# Patient Record
Sex: FEMALE | Race: BLACK OR AFRICAN AMERICAN | NOT HISPANIC OR LATINO | Employment: FULL TIME | URBAN - METROPOLITAN AREA
[De-identification: names, ages, dates, MRNs, and addresses within clinical notes are randomized per-mention and may not be internally consistent; named-entity substitution may affect disease eponyms.]

---

## 2017-11-21 ENCOUNTER — OFFICE VISIT (OUTPATIENT)
Dept: PRIMARY CARE CLINIC | Facility: CLINIC | Age: 32
End: 2017-11-21
Payer: COMMERCIAL

## 2017-11-21 VITALS
HEART RATE: 78 BPM | DIASTOLIC BLOOD PRESSURE: 76 MMHG | SYSTOLIC BLOOD PRESSURE: 130 MMHG | TEMPERATURE: 98 F | HEIGHT: 63 IN | WEIGHT: 238.13 LBS | BODY MASS INDEX: 42.19 KG/M2

## 2017-11-21 DIAGNOSIS — R50.9 FEVER, UNSPECIFIED FEVER CAUSE: ICD-10-CM

## 2017-11-21 DIAGNOSIS — J02.9 SORE THROAT: Primary | ICD-10-CM

## 2017-11-21 DIAGNOSIS — R53.83 FATIGUE, UNSPECIFIED TYPE: ICD-10-CM

## 2017-11-21 PROCEDURE — 99999 PR PBB SHADOW E&M-EST. PATIENT-LVL III: CPT | Mod: PBBFAC,,, | Performed by: NURSE PRACTITIONER

## 2017-11-21 PROCEDURE — 99202 OFFICE O/P NEW SF 15 MIN: CPT | Mod: S$GLB,,, | Performed by: NURSE PRACTITIONER

## 2017-11-21 RX ORDER — AMOXICILLIN 875 MG/1
875 TABLET, FILM COATED ORAL EVERY 12 HOURS
Qty: 20 TABLET | Refills: 0 | Status: SHIPPED | OUTPATIENT
Start: 2017-11-21 | End: 2017-12-01

## 2017-11-21 NOTE — PATIENT INSTRUCTIONS

## 2017-11-21 NOTE — LETTER
November 21, 2017      [facility]  [address], [address]  [facility] [address]  Phone: [phone]  Fax: [phone]       Patient: Sotero Pelaez   YOB: 1985  Date of Visit: 11/21/2017    To Whom It May Concern:    Willie Pelaez  was at [facility] on 11/21/2017. She may return to work/school on 11/24/17 with no restrictions if no fever. If you have any questions or concerns, or if I can be of further assistance, please do not hesitate to contact me.    Sincerely,    Lacey Agosto, BHAVANA, NP-C

## 2017-11-21 NOTE — PROGRESS NOTES
Subjective:       Patient ID: Sotero Pelaez is a 32 y.o. female.    Chief Complaint: Sore Throat and Rhinitis    Sore Throat    The current episode started in the past 7 days. The problem has been gradually worsening. Neither side of throat is experiencing more pain than the other. The maximum temperature recorded prior to her arrival was 100.4 - 100.9 F. The fever has been present for 1 to 2 days. The pain is at a severity of 8/10. The pain is moderate. Associated symptoms include congestion and swollen glands. Pertinent negatives include no abdominal pain, coughing, diarrhea, drooling, ear discharge, ear pain, headaches, hoarse voice, plugged ear sensation, neck pain, shortness of breath, stridor, trouble swallowing or vomiting. She has had exposure to strep. She has had no exposure to mono. She has tried acetaminophen and cool liquids for the symptoms. The treatment provided no relief.     Review of Systems   Constitutional: Positive for fatigue and fever.   HENT: Positive for congestion and sore throat. Negative for drooling, ear discharge, ear pain, hoarse voice, postnasal drip, rhinorrhea, sinus pain, sinus pressure, sneezing and trouble swallowing.    Respiratory: Negative for cough, shortness of breath, wheezing and stridor.    Gastrointestinal: Negative for abdominal pain, constipation, diarrhea, nausea and vomiting.   Genitourinary: Negative for difficulty urinating.   Musculoskeletal: Negative for neck pain.   Skin: Negative for rash.   Neurological: Negative for headaches.   Hematological: Positive for adenopathy.   All other systems reviewed and are negative.      Objective:      Physical Exam   Constitutional: She is oriented to person, place, and time. She appears well-developed and well-nourished. No distress.   HENT:   Head: Normocephalic and atraumatic.   Right Ear: External ear normal.   Left Ear: External ear normal.   Mouth/Throat: No oropharyngeal exudate.   Nose: nasal mucosa pink and  boggy with scant amount mucoid congestion.  Throat: tonsils 2+, erythematous.  Appears strep like.  Tongue midline airway patent.    Eyes: Conjunctivae are normal. Right eye exhibits no discharge. Left eye exhibits no discharge. No scleral icterus.   Neck: Normal range of motion. Neck supple.   Cardiovascular: Normal rate, regular rhythm, normal heart sounds and intact distal pulses.    Pulmonary/Chest: Effort normal and breath sounds normal.   Abdominal: Soft. Bowel sounds are normal. She exhibits no distension. There is no tenderness.   Musculoskeletal: Normal range of motion. She exhibits no edema.   Lymphadenopathy:     She has cervical adenopathy (bilateral anterior cervical ).   Neurological: She is alert and oriented to person, place, and time.   Skin: Skin is warm and dry. Capillary refill takes less than 2 seconds. She is not diaphoretic.   Psychiatric: She has a normal mood and affect. Her behavior is normal.   Nursing note and vitals reviewed.      Assessment:       1. Sore throat    2. Fever, unspecified fever cause    3. Fatigue, unspecified type        Plan:       Sore throat  Fever  Fatigue   Rapid strep A is negative.  Influenza is negative.  Given patient exam and exposure to strep from family member will treat with Amoxicillin.     Patient was instructed to increase fluids and rest today.  May use throat lozenge of their choice and OTC fever/pain reducer as per label instructions.  Warm salt water gargles with 1/4 tsp of salt with 8 oz of water 2-3 times per day.  May use OTC antihistamine of choice as per label instructions.     Medication List with Changes/Refills   New Medications    AMOXICILLIN (AMOXIL) 875 MG TABLET    Take 1 tablet (875 mg total) by mouth every 12 (twelve) hours.   Current Medications    CLOBETASOL 0.05% (TEMOVATE) 0.05 % OINT    Apply topically 2 (two) times daily.           I have reviewed the patient's past medical/surgical and social histories and updated as appropriate.  Medications were reviewed and discussed as appropriate including side effects and risks versus benefit.     Plan of care was reviewed and agreed upon with the patient.  An opportunity to ask questions was provided and explanation given. Patient verbalized understanding on all information reviewed and discussed.  The patient will follow up with PCP or sooner if needed. If symptoms worsen patient may call for ASAP appointment or report to the emergency department for further evaluation.

## 2017-12-22 ENCOUNTER — TELEPHONE (OUTPATIENT)
Dept: OBSTETRICS AND GYNECOLOGY | Facility: CLINIC | Age: 32
End: 2017-12-22

## 2017-12-22 ENCOUNTER — PATIENT MESSAGE (OUTPATIENT)
Dept: OBSTETRICS AND GYNECOLOGY | Facility: CLINIC | Age: 32
End: 2017-12-22

## 2017-12-27 ENCOUNTER — OFFICE VISIT (OUTPATIENT)
Dept: OBSTETRICS AND GYNECOLOGY | Facility: CLINIC | Age: 32
End: 2017-12-27
Payer: COMMERCIAL

## 2017-12-27 ENCOUNTER — LAB VISIT (OUTPATIENT)
Dept: LAB | Facility: HOSPITAL | Age: 32
End: 2017-12-27
Attending: NURSE PRACTITIONER
Payer: COMMERCIAL

## 2017-12-27 VITALS
DIASTOLIC BLOOD PRESSURE: 68 MMHG | HEART RATE: 78 BPM | SYSTOLIC BLOOD PRESSURE: 105 MMHG | BODY MASS INDEX: 42.23 KG/M2 | WEIGHT: 238.31 LBS | HEIGHT: 63 IN

## 2017-12-27 DIAGNOSIS — Z11.3 ENCOUNTER FOR SCREENING EXAMINATION FOR SEXUALLY TRANSMITTED DISEASE: ICD-10-CM

## 2017-12-27 DIAGNOSIS — Z30.8 ENCOUNTER FOR OTHER CONTRACEPTIVE MANAGEMENT: ICD-10-CM

## 2017-12-27 DIAGNOSIS — Z01.419 ENCOUNTER FOR WELL WOMAN EXAM: Primary | ICD-10-CM

## 2017-12-27 LAB
C TRACH DNA SPEC QL NAA+PROBE: NOT DETECTED
CANDIDA RRNA VAG QL PROBE: NEGATIVE
G VAGINALIS RRNA GENITAL QL PROBE: POSITIVE
N GONORRHOEA DNA SPEC QL NAA+PROBE: NOT DETECTED
T VAGINALIS RRNA GENITAL QL PROBE: NEGATIVE

## 2017-12-27 PROCEDURE — 86592 SYPHILIS TEST NON-TREP QUAL: CPT

## 2017-12-27 PROCEDURE — 88175 CYTOPATH C/V AUTO FLUID REDO: CPT

## 2017-12-27 PROCEDURE — 99395 PREV VISIT EST AGE 18-39: CPT | Mod: S$GLB,,, | Performed by: NURSE PRACTITIONER

## 2017-12-27 PROCEDURE — 87591 N.GONORRHOEAE DNA AMP PROB: CPT

## 2017-12-27 PROCEDURE — 36415 COLL VENOUS BLD VENIPUNCTURE: CPT

## 2017-12-27 PROCEDURE — 99999 PR PBB SHADOW E&M-EST. PATIENT-LVL III: CPT | Mod: PBBFAC,,, | Performed by: NURSE PRACTITIONER

## 2017-12-27 PROCEDURE — 87660 TRICHOMONAS VAGIN DIR PROBE: CPT

## 2017-12-27 PROCEDURE — 86703 HIV-1/HIV-2 1 RESULT ANTBDY: CPT

## 2017-12-27 PROCEDURE — 87480 CANDIDA DNA DIR PROBE: CPT

## 2017-12-28 ENCOUNTER — TELEPHONE (OUTPATIENT)
Dept: OBSTETRICS AND GYNECOLOGY | Facility: CLINIC | Age: 32
End: 2017-12-28

## 2017-12-28 LAB
HIV 1+2 AB+HIV1 P24 AG SERPL QL IA: NEGATIVE
RPR SER QL: NORMAL

## 2017-12-28 RX ORDER — TINIDAZOLE 500 MG/1
2 TABLET ORAL
Qty: 8 TABLET | Refills: 0 | Status: SHIPPED | OUTPATIENT
Start: 2017-12-28 | End: 2017-12-30

## 2017-12-28 NOTE — TELEPHONE ENCOUNTER
Patient informed of results and recommendations as noted by AIMEE Barrera NP. Patient verbalized understanding.

## 2017-12-28 NOTE — TELEPHONE ENCOUNTER
Please inform pt that her vaginosis screening was negative for yeast and trichomonas and positive for bacterial vaginosis (BV).     I am sending in a prescription for tindamax to treat the BV. Take 4 pills all at once on the first day with a meal and do the same on the second day.     Her cervical cultures were negative for chlamydia and gonorrhea and her HIV and syphilis results were also negative.     Thanks!

## 2018-01-02 ENCOUNTER — TELEPHONE (OUTPATIENT)
Dept: OBSTETRICS AND GYNECOLOGY | Facility: CLINIC | Age: 33
End: 2018-01-02

## 2018-01-02 NOTE — TELEPHONE ENCOUNTER
----- Message from Mayra Pond sent at 1/2/2018 11:21 AM CST -----  Contact: self   Patient want to speak with a nurse regarding having her birth control before she come to appointment please call back at [phone]

## 2018-01-03 DIAGNOSIS — Z30.017 ENCOUNTER FOR INITIAL PRESCRIPTION OF IMPLANTABLE SUBDERMAL CONTRACEPTIVE: Primary | ICD-10-CM

## 2018-01-05 ENCOUNTER — OFFICE VISIT (OUTPATIENT)
Dept: OBSTETRICS AND GYNECOLOGY | Facility: CLINIC | Age: 33
End: 2018-01-05
Payer: COMMERCIAL

## 2018-01-05 VITALS
WEIGHT: 241.63 LBS | HEART RATE: 93 BPM | DIASTOLIC BLOOD PRESSURE: 77 MMHG | HEIGHT: 63 IN | SYSTOLIC BLOOD PRESSURE: 130 MMHG | BODY MASS INDEX: 42.81 KG/M2

## 2018-01-05 DIAGNOSIS — Z01.812 PRE-PROCEDURE LAB EXAM: ICD-10-CM

## 2018-01-05 DIAGNOSIS — N89.8 VAGINAL ODOR: ICD-10-CM

## 2018-01-05 DIAGNOSIS — N89.8 VAGINAL DISCHARGE: ICD-10-CM

## 2018-01-05 DIAGNOSIS — Z11.3 ENCOUNTER FOR SCREENING EXAMINATION FOR SEXUALLY TRANSMITTED DISEASE: ICD-10-CM

## 2018-01-05 DIAGNOSIS — Z30.017 NEXPLANON INSERTION: Primary | ICD-10-CM

## 2018-01-05 LAB
B-HCG UR QL: NEGATIVE
CANDIDA RRNA VAG QL PROBE: POSITIVE
CTP QC/QA: YES
G VAGINALIS RRNA GENITAL QL PROBE: NEGATIVE
T VAGINALIS RRNA GENITAL QL PROBE: NEGATIVE

## 2018-01-05 PROCEDURE — 99999 PR PBB SHADOW E&M-EST. PATIENT-LVL III: CPT | Mod: PBBFAC,,, | Performed by: NURSE PRACTITIONER

## 2018-01-05 PROCEDURE — 87491 CHLMYD TRACH DNA AMP PROBE: CPT

## 2018-01-05 PROCEDURE — 11981 INSERTION DRUG DLVR IMPLANT: CPT | Mod: S$GLB,,, | Performed by: NURSE PRACTITIONER

## 2018-01-05 PROCEDURE — 87480 CANDIDA DNA DIR PROBE: CPT

## 2018-01-05 PROCEDURE — 99213 OFFICE O/P EST LOW 20 MIN: CPT | Mod: 25,S$GLB,, | Performed by: NURSE PRACTITIONER

## 2018-01-05 PROCEDURE — 81025 URINE PREGNANCY TEST: CPT | Mod: S$GLB,,, | Performed by: NURSE PRACTITIONER

## 2018-01-05 RX ORDER — LIDOCAINE HYDROCHLORIDE 10 MG/ML
2 INJECTION, SOLUTION EPIDURAL; INFILTRATION; INTRACAUDAL; PERINEURAL
Status: COMPLETED | OUTPATIENT
Start: 2018-01-05 | End: 2018-01-05

## 2018-01-05 RX ADMIN — LIDOCAINE HYDROCHLORIDE 20 MG: 10 INJECTION, SOLUTION EPIDURAL; INFILTRATION; INTRACAUDAL; PERINEURAL at 03:01

## 2018-01-05 NOTE — PROGRESS NOTES
CC: NEXPLANON INSERTION      PRE-NEXPLANON INSERTION COUNSELING:  All contraceptive options were reviewed and the patient chooses Nexplanon.  Patients history was reviewed and there were no contraindications to Nexplanon.  The procedure and minimal risks of pain, bleeding, bruising and infection at the insertion site discussed. Possible irregular menstrual bleeding pattern versus amenorrhea was explained.  No protection against STDs discussed.  Written information provided; all questions answered and patient agrees to proceed.  Consent signed and scanned into computer.  NEXPLANON LOT #[id number],  EXPIRATION 05/2020    Vitals:    01/05/18 1431   BP: 130/77   Pulse: 93       EXAM:  With patient in supine position the nondominant arm was flexed at the elbow and externally rotated.  The insertion site was identified 6-8 cm above the elbow crease at the inner side of the upper arm overlying the groove between biceps and triceps.  The insertion site was marked and a second verito was placed 6-8 cm above the first.    PROCEDURE:  TIME OUT PERFORMED.  The insertion site was prepped with antiseptic and injected with 2 cc of 1% Xylocaine without epinephrine subq along the planned insertion canal.  Using sterile technique the Nexplanon applicator was visually verified and removed from the blister pack.  The Transparent Protection Cap was removed by sliding it horizontally in the direction of the arrow away from the needle.  The white colored implant was visualized by looking into the tip of the needle.   The needle tip was inserted bevel side up at a 30 degree angle to penetrate the skin.  The applicator was lowered parallel to the arm and the skin was tented with the needle.  While lifting skin with the needle, the needle was inserted to its full length.  Keeping the applicator in place, the purple slider was unlocked by pushing it slightly down.  The slider was then moved fully back until it stopped.  The applicator was then  removed. The Needle was noted to be fully retracted and only the purple tip of the obturator was visible.  The implant was palpable beneath the skin after insertion.  A small adhesive bandage and then a pressure bandage was placed over the insertion site.  The patient tolerated the procedure well.    ASSESSMENT:  1. Contraception management / Nexplanon insertion.V25.0.    POST NEXPLANON INSERTION COUNSELING:  Manage post nexplanon placement arm pain with NSAIDs, Tylenol or Rx per MedCard.  Keep arm elevated and apply intermittent ice packs to decrease pain and bruising for 24 Hours.  May remove bandage in 24 hours.  Nexplanon danger signs (worsening pain at insertion site, bleeding through bandage, redness and/or pus drainage at insertion site).  Removal in 3 years.    Counseling lasted approximately 15 minutes and all her questions were answered.    FOLLOW-UP: with me in 4 weeks.

## 2018-01-05 NOTE — PROGRESS NOTES
Chief Complaint   Patient presents with    Contraception    Vaginal Discharge     thick       History of Present Illness: Sotero Pelaez is a 32 y.o. female that presents today 2018 for   Pt here today c/o vaginal discharge that is thick white and has an odor. She denies any itching or burning. She reports that she had oral intercourse this past weekend and would also like STD testing. Pt is also having nexplanon removed and a new one re-inserted (see other note for insertion).       Chief Complaint   Patient presents with    Contraception    Vaginal Discharge     thick         Past Medical History:   Diagnosis Date    Abnormal Pap smear     No further treatment per patient.  Recheck normal per patient.    Asthma     since younger    Herpes simplex without mention of complication        Past Surgical History:   Procedure Laterality Date    EYE SURGERY         No current outpatient prescriptions on file.     No current facility-administered medications for this visit.        Review of patient's allergies indicates:  No Known Allergies    Family History   Problem Relation Age of Onset    Breast cancer Maternal Aunt     Colon cancer Neg Hx     Ovarian cancer Neg Hx        Social History   Substance Use Topics    Smoking status: Never Smoker    Smokeless tobacco: Never Used    Alcohol use Yes      Comment: Seldomly       OB History    Para Term  AB Living   3 2 2   1 2   SAB TAB Ectopic Multiple Live Births   0       2      # Outcome Date GA Lbr Sushil/2nd Weight Sex Delivery Anes PTL Lv   3 Term 11   3.175 kg (7 lb) M Vag-Spont EPI N CORINA   2 AB 09           1 Term 08   2.838 kg (6 lb 4.1 oz) M Vag-Spont EPI N CORINA          Review of Symptoms:  GENERAL: Denies weight gain or weight loss. Feeling well overall.   SKIN: Denies rash or lesions.   ABDOMEN: No abdominal pain, constipation, diarrhea, nausea, vomiting or rectal bleeding.   URINARY: No frequency, dysuria,  "hematuria, or burning on urination.    /77   Pulse 93   Ht 5' 3" (1.6 m)   Wt 109.6 kg (241 lb 10 oz)   LMP 12/19/2017   Physical Exam:  APPEARANCE: Well nourished, well developed, in no acute distress.  SKIN: Normal skin turgor, no lesions.  RESPIRATORY: Normal respiratory effort with no retractions or use of accessory muscles  PELVIC: Normal external female genitalia without lesions. Normal hair distribution. Vagina moist and well rugated without lesions; + thick clumpy green discharge; odor noted. Cervix pink and without lesions.   EXTREMITIES: Nexplanon removed from L upper inner arm. Area cleaned with betadine and 2cc lidocaine injected into area right above initial insertion site of device. Sterile gloves applied. Small incision made above initial insertion site with #11 blade. Nexplanon device removed without difficulty with hemostats. Device fully intact. See procedure note for insertion of new nexplanon.     ASSESSMENT/PLAN:  Nexplanon insertion    Pre-procedure lab exam  -     POCT Urine Pregnancy    Vaginal discharge  -     Vaginosis Screen by DNA Probe    Vaginal odor  -     Vaginosis Screen by DNA Probe    Encounter for screening examination for sexually transmitted disease  -     Vaginosis Screen by DNA Probe  -     C. trachomatis/N. gonorrhoeae by AMP DNA Cervix      Follow-up:  Will follow-up once results are received  RTC if symptoms worsen or do not improve  RTC as needed      "

## 2018-01-07 LAB
C TRACH DNA SPEC QL NAA+PROBE: NOT DETECTED
N GONORRHOEA DNA SPEC QL NAA+PROBE: NOT DETECTED

## 2018-01-08 ENCOUNTER — TELEPHONE (OUTPATIENT)
Dept: OBSTETRICS AND GYNECOLOGY | Facility: CLINIC | Age: 33
End: 2018-01-08

## 2018-01-08 RX ORDER — FLUCONAZOLE 150 MG/1
150 TABLET ORAL DAILY
Qty: 1 TABLET | Refills: 1 | Status: SHIPPED | OUTPATIENT
Start: 2018-01-08 | End: 2018-02-06

## 2018-02-06 ENCOUNTER — OFFICE VISIT (OUTPATIENT)
Dept: OBSTETRICS AND GYNECOLOGY | Facility: CLINIC | Age: 33
End: 2018-02-06
Payer: COMMERCIAL

## 2018-02-06 VITALS — WEIGHT: 240.5 LBS | HEIGHT: 63 IN | BODY MASS INDEX: 42.61 KG/M2

## 2018-02-06 DIAGNOSIS — Z97.5 NEXPLANON IN PLACE: Primary | ICD-10-CM

## 2018-02-06 PROCEDURE — 99999 PR PBB SHADOW E&M-EST. PATIENT-LVL II: CPT | Mod: PBBFAC,,, | Performed by: NURSE PRACTITIONER

## 2018-02-06 PROCEDURE — 99499 UNLISTED E&M SERVICE: CPT | Mod: S$GLB,,, | Performed by: NURSE PRACTITIONER

## 2018-02-06 NOTE — PROGRESS NOTES
Chief Complaint   Patient presents with    Contraception     Nexplanon Follow Up       History of Present Illness: Sotero Pelaez is a 32 y.o. female that presents today 2018 for   Patient presents for 1 month follow-up for nexplanon insertion.  Patient reports no issues with implant and has experienced cessation of cycle since implant was placed.     No other complaints or comments at this time      Chief Complaint   Patient presents with    Contraception     Nexplanon Follow Up         Past Medical History:   Diagnosis Date    Abnormal Pap smear     No further treatment per patient.  Recheck normal per patient.    Asthma     since younger    Herpes simplex without mention of complication        Past Surgical History:   Procedure Laterality Date    EYE SURGERY         No current outpatient prescriptions on file.     No current facility-administered medications for this visit.        Review of patient's allergies indicates:  No Known Allergies    Family History   Problem Relation Age of Onset    Breast cancer Maternal Aunt     Colon cancer Neg Hx     Ovarian cancer Neg Hx        Social History   Substance Use Topics    Smoking status: Never Smoker    Smokeless tobacco: Never Used    Alcohol use Yes      Comment: Seldomly       OB History    Para Term  AB Living   3 2 2   1 2   SAB TAB Ectopic Multiple Live Births   0       2      # Outcome Date GA Lbr Sushil/2nd Weight Sex Delivery Anes PTL Lv   3 Term 11   3.175 kg (7 lb) M Vag-Spont EPI N CORINA   2 AB 09           1 Term 08   2.838 kg (6 lb 4.1 oz) M Vag-Spont EPI N CORINA          Review of Symptoms:  GENERAL: Denies weight gain or weight loss. Feeling well overall.   SKIN: Denies rash or lesions.   HEAD: Denies head injury or headache.   CHEST: Denies chest pain or shortness of breath.   CARDIOVASCULAR: Denies palpitations or left sided chest pain.   ABDOMEN: No abdominal pain, constipation, diarrhea, nausea, vomiting  "or rectal bleeding.   URINARY: No frequency, dysuria, hematuria, or burning on urination.    Ht 5' 3" (1.6 m)   Wt 109.1 kg (240 lb 8.4 oz)   Physical Exam:  APPEARANCE: Well nourished, well developed, in no acute distress.  SKIN: Normal skin turgor, no lesions.  RESPIRATORY: Normal respiratory effort with no retractions or use of accessory muscles  EXTREMITIES: Device was palpated under the skin of the left medial upper arm.  Insertion site closed and resurfaced.      ASSESSMENT/PLAN:  Nexplanon in place    Follow-up:  RTC for well woman exam or as needed        "

## 2018-03-08 PROBLEM — D64.9 ANEMIA: Status: ACTIVE | Noted: 2018-03-08

## 2018-03-08 PROBLEM — R73.02 IGT (IMPAIRED GLUCOSE TOLERANCE): Status: ACTIVE | Noted: 2018-03-08

## 2018-05-08 PROBLEM — L02.429 BOIL, THIGH: Status: ACTIVE | Noted: 2018-05-08

## 2019-02-08 ENCOUNTER — OFFICE VISIT (OUTPATIENT)
Dept: OBSTETRICS AND GYNECOLOGY | Facility: CLINIC | Age: 34
End: 2019-02-08
Payer: COMMERCIAL

## 2019-02-08 ENCOUNTER — LAB VISIT (OUTPATIENT)
Dept: LAB | Facility: HOSPITAL | Age: 34
End: 2019-02-08
Attending: OBSTETRICS & GYNECOLOGY
Payer: COMMERCIAL

## 2019-02-08 VITALS
DIASTOLIC BLOOD PRESSURE: 80 MMHG | BODY MASS INDEX: 41.56 KG/M2 | SYSTOLIC BLOOD PRESSURE: 124 MMHG | HEIGHT: 63 IN | WEIGHT: 234.56 LBS

## 2019-02-08 DIAGNOSIS — Z01.419 WELL WOMAN EXAM WITH ROUTINE GYNECOLOGICAL EXAM: Primary | ICD-10-CM

## 2019-02-08 DIAGNOSIS — Z01.419 ENCOUNTER FOR WELL WOMAN EXAM WITH ROUTINE GYNECOLOGICAL EXAM: ICD-10-CM

## 2019-02-08 DIAGNOSIS — Z11.3 SCREENING FOR STD (SEXUALLY TRANSMITTED DISEASE): ICD-10-CM

## 2019-02-08 PROCEDURE — 86696 HERPES SIMPLEX TYPE 2 TEST: CPT

## 2019-02-08 PROCEDURE — 99395 PR PREVENTIVE VISIT,EST,18-39: ICD-10-PCS | Mod: S$GLB,,, | Performed by: OBSTETRICS & GYNECOLOGY

## 2019-02-08 PROCEDURE — 80074 ACUTE HEPATITIS PANEL: CPT

## 2019-02-08 PROCEDURE — 99999 PR PBB SHADOW E&M-EST. PATIENT-LVL III: CPT | Mod: PBBFAC,,, | Performed by: OBSTETRICS & GYNECOLOGY

## 2019-02-08 PROCEDURE — 86592 SYPHILIS TEST NON-TREP QUAL: CPT

## 2019-02-08 PROCEDURE — 99999 PR PBB SHADOW E&M-EST. PATIENT-LVL III: ICD-10-PCS | Mod: PBBFAC,,, | Performed by: OBSTETRICS & GYNECOLOGY

## 2019-02-08 PROCEDURE — 99395 PREV VISIT EST AGE 18-39: CPT | Mod: S$GLB,,, | Performed by: OBSTETRICS & GYNECOLOGY

## 2019-02-08 PROCEDURE — 86703 HIV-1/HIV-2 1 RESULT ANTBDY: CPT

## 2019-02-08 PROCEDURE — 36415 COLL VENOUS BLD VENIPUNCTURE: CPT | Mod: PO

## 2019-02-08 PROCEDURE — 87491 CHLMYD TRACH DNA AMP PROBE: CPT

## 2019-02-08 PROCEDURE — 87624 HPV HI-RISK TYP POOLED RSLT: CPT

## 2019-02-08 PROCEDURE — 88175 CYTOPATH C/V AUTO FLUID REDO: CPT

## 2019-02-08 NOTE — PROGRESS NOTES
Subjective:       Patient ID: Sotero Pelaez is a 33 y.o. female.    Chief Complaint:  Well Woman      History of Present Illness  HPI  33 y.o.  Ab1 for annual exam/ Patient is requesting STD work-up. Patient with Nexplanon since 20 and reports occasional spotting.    GYN & OB History  No LMP recorded. Patient has had an implant.   Date of Last Pap: 2017    OB History    Para Term  AB Living   3 2 2   1 2   SAB TAB Ectopic Multiple Live Births   0       2      # Outcome Date GA Lbr Sushil/2nd Weight Sex Delivery Anes PTL Lv   3 Term 11   3.175 kg (7 lb) M Vag-Spont EPI N CORINA   2 AB 09           1 Term 08   2.838 kg (6 lb 4.1 oz) M Vag-Spont EPI N CORINA          Review of Systems  Review of Systems   Constitutional: Negative for activity change, appetite change, chills, diaphoresis, fatigue, fever and unexpected weight change.   HENT: Negative for nasal congestion, mouth sores and tinnitus.    Eyes: Negative for discharge and visual disturbance.   Respiratory: Negative for cough, shortness of breath and wheezing.    Cardiovascular: Negative for chest pain, palpitations and leg swelling.   Gastrointestinal: Negative for abdominal pain, bloating, blood in stool, constipation, diarrhea, nausea, vomiting, reflux and fecal incontinence.   Endocrine: Negative for diabetes, hair loss, hot flashes, hyperthyroidism and hypothyroidism.   Genitourinary: Positive for menstrual problem. Negative for bladder incontinence, decreased libido, dysmenorrhea, dyspareunia, dysuria, flank pain, frequency, genital sores, hematuria, hot flashes, menorrhagia, pelvic pain, urgency, vaginal bleeding, vaginal discharge, vaginal pain, urinary incontinence, postcoital bleeding, postmenopausal bleeding, vaginal dryness and vaginal odor.   Musculoskeletal: Negative for arthralgias, back pain, joint swelling, leg pain and myalgias.   Neurological: Negative for vertigo, seizures, syncope, numbness and  headaches.   Hematological: Negative for adenopathy. Does not bruise/bleed easily.   Psychiatric/Behavioral: Negative for depression and sleep disturbance. The patient is not nervous/anxious.    Breast: Negative for asymmetry, breast self exam, lump, mass, mastodynia, nipple discharge, skin changes and tenderness          Objective:    Physical Exam:   Constitutional: She is oriented to person, place, and time. She appears well-developed and well-nourished. No distress.    HENT:   Head: Normocephalic.   Nose: No epistaxis.    Eyes: Pupils are equal, round, and reactive to light.    Neck: Normal range of motion.    Cardiovascular: Normal rate.     Pulmonary/Chest: Effort normal.   Breasts: pendulous bilaterally. No obvious masses palpated        Abdominal: Soft. She exhibits no distension. There is no tenderness.     Genitourinary: Vagina normal and uterus normal.   Genitourinary Comments: Pap smear and cultures done of cervix.           Musculoskeletal: Normal range of motion and moves all extremeties.       Neurological: She is alert and oriented to person, place, and time.    Skin: Skin is warm and dry.    Psychiatric: She has a normal mood and affect. Her behavior is normal. Judgment and thought content normal.          Assessment:        1. Well woman exam with routine gynecological exam    2. Encounter for well woman exam with routine gynecological exam    3. Screening for STD (sexually transmitted disease)                Plan:      Annual exams

## 2019-02-09 LAB — RPR SER QL: NORMAL

## 2019-02-11 LAB
HAV IGM SERPL QL IA: NEGATIVE
HBV CORE IGM SERPL QL IA: NEGATIVE
HBV SURFACE AG SERPL QL IA: NEGATIVE
HCV AB SERPL QL IA: NEGATIVE
HIV 1+2 AB+HIV1 P24 AG SERPL QL IA: NEGATIVE

## 2019-02-12 LAB
C TRACH DNA SPEC QL NAA+PROBE: NOT DETECTED
HSV1 IGG SERPL QL IA: NEGATIVE
HSV2 IGG SERPL QL IA: POSITIVE
N GONORRHOEA DNA SPEC QL NAA+PROBE: NOT DETECTED

## 2019-02-14 LAB
HPV HR 12 DNA CVX QL NAA+PROBE: NEGATIVE
HPV16 AG SPEC QL: NEGATIVE
HPV18 DNA SPEC QL NAA+PROBE: NEGATIVE

## 2019-04-01 ENCOUNTER — PATIENT MESSAGE (OUTPATIENT)
Dept: OBSTETRICS AND GYNECOLOGY | Facility: CLINIC | Age: 34
End: 2019-04-01

## 2019-04-05 ENCOUNTER — OFFICE VISIT (OUTPATIENT)
Dept: OBSTETRICS AND GYNECOLOGY | Facility: CLINIC | Age: 34
End: 2019-04-05
Payer: COMMERCIAL

## 2019-04-05 ENCOUNTER — LAB VISIT (OUTPATIENT)
Dept: LAB | Facility: HOSPITAL | Age: 34
End: 2019-04-05
Attending: OBSTETRICS & GYNECOLOGY
Payer: COMMERCIAL

## 2019-04-05 VITALS
HEIGHT: 63 IN | BODY MASS INDEX: 40.12 KG/M2 | WEIGHT: 226.44 LBS | DIASTOLIC BLOOD PRESSURE: 66 MMHG | SYSTOLIC BLOOD PRESSURE: 106 MMHG

## 2019-04-05 DIAGNOSIS — N93.8 DUB (DYSFUNCTIONAL UTERINE BLEEDING): ICD-10-CM

## 2019-04-05 DIAGNOSIS — N93.8 DUB (DYSFUNCTIONAL UTERINE BLEEDING): Primary | ICD-10-CM

## 2019-04-05 LAB — HCG INTACT+B SERPL-ACNC: <1.2 MIU/ML

## 2019-04-05 PROCEDURE — 99213 PR OFFICE/OUTPT VISIT, EST, LEVL III, 20-29 MIN: ICD-10-PCS | Mod: S$GLB,,, | Performed by: OBSTETRICS & GYNECOLOGY

## 2019-04-05 PROCEDURE — 99213 OFFICE O/P EST LOW 20 MIN: CPT | Mod: S$GLB,,, | Performed by: OBSTETRICS & GYNECOLOGY

## 2019-04-05 PROCEDURE — 3008F PR BODY MASS INDEX (BMI) DOCUMENTED: ICD-10-PCS | Mod: CPTII,S$GLB,, | Performed by: OBSTETRICS & GYNECOLOGY

## 2019-04-05 PROCEDURE — 3008F BODY MASS INDEX DOCD: CPT | Mod: CPTII,S$GLB,, | Performed by: OBSTETRICS & GYNECOLOGY

## 2019-04-05 PROCEDURE — 84702 CHORIONIC GONADOTROPIN TEST: CPT

## 2019-04-05 PROCEDURE — 99999 PR PBB SHADOW E&M-EST. PATIENT-LVL III: CPT | Mod: PBBFAC,,, | Performed by: OBSTETRICS & GYNECOLOGY

## 2019-04-05 PROCEDURE — 36415 COLL VENOUS BLD VENIPUNCTURE: CPT | Mod: PO

## 2019-04-05 PROCEDURE — 99999 PR PBB SHADOW E&M-EST. PATIENT-LVL III: ICD-10-PCS | Mod: PBBFAC,,, | Performed by: OBSTETRICS & GYNECOLOGY

## 2019-04-05 NOTE — PROGRESS NOTES
Subjective:       Patient ID: Sotero Pelaez is a 33 y.o. female.    Chief Complaint:  Menorrhagia      History of Present Illness  HPI  33 y.o.  Ab1 with complaint of heavy vaginal bleeding for the past 2 weeks. Patient reports bleeding has now stopped. patient has Nexplanon since 20 with no menses since insertion.Patient has recently initiated a low calorie diet and exercise regimen with successful loss of weight.    GYN & OB History  Patient's last menstrual period was 2019.   Date of Last Pap:normal on 19    OB History    Para Term  AB Living   3 2 2   1 2   SAB TAB Ectopic Multiple Live Births   0       2      # Outcome Date GA Lbr Sushil/2nd Weight Sex Delivery Anes PTL Lv   3 Term 11   3.175 kg (7 lb) M Vag-Spont EPI N CORINA   2 AB 09           1 Term 08   2.838 kg (6 lb 4.1 oz) M Vag-Spont EPI N CORINA       Review of Systems  Review of Systems   Constitutional: Negative for activity change, appetite change, chills, diaphoresis, fatigue, fever and unexpected weight change.   HENT: Negative for nasal congestion, mouth sores and tinnitus.    Eyes: Negative for discharge and visual disturbance.   Respiratory: Negative for cough, shortness of breath and wheezing.    Cardiovascular: Negative for chest pain, palpitations and leg swelling.   Gastrointestinal: Negative for abdominal pain, bloating, blood in stool, constipation, diarrhea, nausea, vomiting, reflux and fecal incontinence.   Endocrine: Negative for diabetes, hair loss, hot flashes, hyperthyroidism and hypothyroidism.   Genitourinary: Positive for menorrhagia and menstrual problem. Negative for bladder incontinence, decreased libido, dysmenorrhea, dyspareunia, dysuria, flank pain, frequency, genital sores, hematuria, hot flashes, pelvic pain, urgency, vaginal bleeding, vaginal discharge, vaginal pain, urinary incontinence, postcoital bleeding, postmenopausal bleeding, vaginal dryness and vaginal odor.    Musculoskeletal: Negative for back pain, joint swelling and myalgias.   Integumentary:  Negative for rash, acne, hair changes, breast mass, nipple discharge, breast skin changes and breast tenderness.   Neurological: Negative for vertigo, seizures, syncope, numbness and headaches.   Hematological: Negative for adenopathy. Does not bruise/bleed easily.   Psychiatric/Behavioral: Negative for depression and sleep disturbance. The patient is not nervous/anxious.    Breast: Negative for asymmetry, breast self exam, lump, mass, mastodynia, nipple discharge, skin changes and tenderness          Objective:    Physical Exam:   Constitutional: She is oriented to person, place, and time. She appears well-developed and well-nourished. No distress.    HENT:   Head: Normocephalic.   Nose: No epistaxis.    Eyes: Pupils are equal, round, and reactive to light.    Neck: Normal range of motion.    Cardiovascular: Normal rate.     Pulmonary/Chest: Effort normal.          Genitourinary:   Genitourinary Comments: deferred           Musculoskeletal: Normal range of motion and moves all extremeties.       Neurological: She is alert and oriented to person, place, and time.    Skin: Skin is warm and dry.    Psychiatric: She has a normal mood and affect. Her behavior is normal. Judgment and thought content normal.          Assessment:        1. DUB (dysfunctional uterine bleeding)                Plan:      Serum HCG today

## 2019-05-14 ENCOUNTER — PATIENT MESSAGE (OUTPATIENT)
Dept: OBSTETRICS AND GYNECOLOGY | Facility: CLINIC | Age: 34
End: 2019-05-14

## 2019-05-16 ENCOUNTER — OFFICE VISIT (OUTPATIENT)
Dept: OBSTETRICS AND GYNECOLOGY | Facility: CLINIC | Age: 34
End: 2019-05-16
Payer: COMMERCIAL

## 2019-05-16 VITALS
DIASTOLIC BLOOD PRESSURE: 72 MMHG | SYSTOLIC BLOOD PRESSURE: 116 MMHG | HEIGHT: 63 IN | RESPIRATION RATE: 18 BRPM | BODY MASS INDEX: 39.84 KG/M2 | WEIGHT: 224.88 LBS

## 2019-05-16 DIAGNOSIS — N93.8 DUB (DYSFUNCTIONAL UTERINE BLEEDING): ICD-10-CM

## 2019-05-16 DIAGNOSIS — N92.1 IRREGULAR INTERMENSTRUAL BLEEDING: Primary | ICD-10-CM

## 2019-05-16 DIAGNOSIS — N92.0 MENORRHAGIA WITH REGULAR CYCLE: ICD-10-CM

## 2019-05-16 PROCEDURE — 99214 OFFICE O/P EST MOD 30 MIN: CPT | Mod: S$GLB,,, | Performed by: OBSTETRICS & GYNECOLOGY

## 2019-05-16 PROCEDURE — 99214 PR OFFICE/OUTPT VISIT, EST, LEVL IV, 30-39 MIN: ICD-10-PCS | Mod: S$GLB,,, | Performed by: OBSTETRICS & GYNECOLOGY

## 2019-05-16 PROCEDURE — 3008F PR BODY MASS INDEX (BMI) DOCUMENTED: ICD-10-PCS | Mod: CPTII,S$GLB,, | Performed by: OBSTETRICS & GYNECOLOGY

## 2019-05-16 PROCEDURE — 3008F BODY MASS INDEX DOCD: CPT | Mod: CPTII,S$GLB,, | Performed by: OBSTETRICS & GYNECOLOGY

## 2019-05-16 PROCEDURE — 99999 PR PBB SHADOW E&M-EST. PATIENT-LVL III: ICD-10-PCS | Mod: PBBFAC,,, | Performed by: OBSTETRICS & GYNECOLOGY

## 2019-05-16 PROCEDURE — 99999 PR PBB SHADOW E&M-EST. PATIENT-LVL III: CPT | Mod: PBBFAC,,, | Performed by: OBSTETRICS & GYNECOLOGY

## 2019-05-16 NOTE — PROGRESS NOTES
Subjective:       Patient ID: Sotero Pelaez is a 33 y.o. female.    Chief Complaint:  Menometrorrhagia      History of Present Illness  HPI  33 y.o.  Ab1 with Nexplanon since 20. Patient had prolonged episode of uterine bleeding at end of 2019 and has begun bleeding again on 05/10/19 through today.Patient reports bleeding to be heavy.    GYN & OB History  Patient's last menstrual period was 05/10/2019.   Date of Last Pap: 2/15/2019    OB History    Para Term  AB Living   3 2 2   1 2   SAB TAB Ectopic Multiple Live Births   0       2      # Outcome Date GA Lbr Sushil/2nd Weight Sex Delivery Anes PTL Lv   3 Term 11   3.175 kg (7 lb) M Vag-Spont EPI N CORINA   2 AB 09           1 Term 08   2.838 kg (6 lb 4.1 oz) M Vag-Spont EPI N CORINA       Review of Systems  Review of Systems   Constitutional: Negative for activity change, appetite change, chills, diaphoresis, fatigue, fever and unexpected weight change.   HENT: Negative for nasal congestion, mouth sores and tinnitus.    Eyes: Negative for discharge and visual disturbance.   Respiratory: Negative for cough, shortness of breath and wheezing.    Cardiovascular: Negative for chest pain, palpitations and leg swelling.   Gastrointestinal: Negative for abdominal pain, bloating, blood in stool, constipation, diarrhea, nausea, vomiting, reflux and fecal incontinence.   Endocrine: Negative for diabetes, hair loss, hot flashes, hyperthyroidism and hypothyroidism.   Genitourinary: Positive for menorrhagia and menstrual problem. Negative for bladder incontinence, decreased libido, dysmenorrhea, dyspareunia, dysuria, flank pain, frequency, genital sores, hematuria, hot flashes, pelvic pain, urgency, vaginal bleeding, vaginal discharge, vaginal pain, urinary incontinence, postcoital bleeding, postmenopausal bleeding, vaginal dryness and vaginal odor.   Musculoskeletal: Negative for arthralgias, back pain, joint swelling, leg pain and  myalgias.   Integumentary:  Negative for rash, acne, hair changes, mole/lesion, breast mass, nipple discharge, breast skin changes and breast tenderness.   Neurological: Negative for vertigo, seizures, syncope, numbness and headaches.   Hematological: Negative for adenopathy. Does not bruise/bleed easily.   Psychiatric/Behavioral: Negative for depression and sleep disturbance. The patient is not nervous/anxious.    Breast: Negative for asymmetry, breast self exam, lump, mass, mastodynia, nipple discharge, skin changes and tenderness          Objective:    Physical Exam:   Constitutional: She is oriented to person, place, and time. She appears well-developed and well-nourished. No distress.    HENT:   Head: Normocephalic.   Nose: No epistaxis.    Eyes: Pupils are equal, round, and reactive to light.    Neck: Normal range of motion.    Cardiovascular: Normal rate.     Pulmonary/Chest: Effort normal.        Abdominal: Soft. She exhibits no distension. There is no tenderness.     Genitourinary:   Genitourinary Comments: Scant sanguinous drainage from cervix. Uterus is upper normal size and irregular shape. No obvious adnexal masses           Musculoskeletal: Normal range of motion and moves all extremeties.       Neurological: She is alert and oriented to person, place, and time.    Skin: Skin is warm and dry. She is not diaphoretic.    Psychiatric: She has a normal mood and affect. Her behavior is normal. Thought content normal.          Assessment:        1. Irregular intermenstrual bleeding    2. Menorrhagia with regular cycle    3. DUB (dysfunctional uterine bleeding)                Plan:      Pelvic ultrasound  Multivitamins with Fe

## 2019-05-21 ENCOUNTER — HOSPITAL ENCOUNTER (OUTPATIENT)
Dept: RADIOLOGY | Facility: HOSPITAL | Age: 34
Discharge: HOME | End: 2019-05-21
Attending: OBSTETRICS & GYNECOLOGY
Payer: COMMERCIAL

## 2019-05-21 DIAGNOSIS — N93.8 DUB (DYSFUNCTIONAL UTERINE BLEEDING): ICD-10-CM

## 2019-05-21 PROCEDURE — 76830 TRANSVAGINAL US NON-OB: CPT | Mod: TC,PN

## 2019-05-21 PROCEDURE — 76830 TRANSVAGINAL US NON-OB: CPT | Mod: 26,,, | Performed by: RADIOLOGY

## 2019-05-21 PROCEDURE — 76830 US PELVIS COMP WITH TRANSVAG NON-OB (XPD): ICD-10-PCS | Mod: 26,,, | Performed by: RADIOLOGY

## 2019-05-21 PROCEDURE — 76856 US PELVIS COMP WITH TRANSVAG NON-OB (XPD): ICD-10-PCS | Mod: 26,,, | Performed by: RADIOLOGY

## 2019-05-21 PROCEDURE — 76856 US EXAM PELVIC COMPLETE: CPT | Mod: 26,,, | Performed by: RADIOLOGY

## 2019-06-20 NOTE — TELEPHONE ENCOUNTER
Please call and inform pt that her vaginosis screening was positive for yeast and negative for BV and trichomonas.    Also gonorrhea and chlamydia were negative.     I am sending in diflucan to treat the yeast.    Thanks!   (3) no apparent problem

## 2020-10-22 ENCOUNTER — HOSPITAL ENCOUNTER (OUTPATIENT)
Dept: RADIOLOGY | Facility: HOSPITAL | Age: 35
Discharge: HOME | End: 2020-10-22
Attending: FAMILY MEDICINE
Payer: COMMERCIAL

## 2020-10-22 ENCOUNTER — OFFICE VISIT (OUTPATIENT)
Dept: FAMILY MEDICINE | Facility: CLINIC | Age: 35
End: 2020-10-22
Payer: COMMERCIAL

## 2020-10-22 VITALS
TEMPERATURE: 99 F | SYSTOLIC BLOOD PRESSURE: 128 MMHG | DIASTOLIC BLOOD PRESSURE: 82 MMHG | BODY MASS INDEX: 43.94 KG/M2 | HEART RATE: 93 BPM | HEIGHT: 63 IN | OXYGEN SATURATION: 97 % | WEIGHT: 248 LBS

## 2020-10-22 DIAGNOSIS — M67.431 GANGLION, RIGHT WRIST: ICD-10-CM

## 2020-10-22 DIAGNOSIS — M25.522 LEFT ELBOW PAIN: ICD-10-CM

## 2020-10-22 DIAGNOSIS — M25.522 LEFT ELBOW PAIN: Primary | ICD-10-CM

## 2020-10-22 PROCEDURE — 99213 PR OFFICE/OUTPT VISIT, EST, LEVL III, 20-29 MIN: ICD-10-PCS | Mod: S$GLB,,, | Performed by: FAMILY MEDICINE

## 2020-10-22 PROCEDURE — 99213 OFFICE O/P EST LOW 20 MIN: CPT | Mod: S$GLB,,, | Performed by: FAMILY MEDICINE

## 2020-10-22 PROCEDURE — 73110 X-RAY EXAM OF WRIST: CPT | Mod: TC,RT

## 2020-10-22 PROCEDURE — 73080 X-RAY EXAM OF ELBOW: CPT | Mod: TC,LT

## 2020-10-22 PROCEDURE — 3008F PR BODY MASS INDEX (BMI) DOCUMENTED: ICD-10-PCS | Mod: CPTII,S$GLB,, | Performed by: FAMILY MEDICINE

## 2020-10-22 PROCEDURE — 3008F BODY MASS INDEX DOCD: CPT | Mod: CPTII,S$GLB,, | Performed by: FAMILY MEDICINE

## 2020-10-22 RX ORDER — TRAMADOL HYDROCHLORIDE 50 MG/1
TABLET ORAL
COMMUNITY
Start: 2020-10-22 | End: 2021-11-08 | Stop reason: ALTCHOICE

## 2020-10-22 RX ORDER — IBUPROFEN 800 MG/1
TABLET ORAL
COMMUNITY
Start: 2020-10-22 | End: 2020-11-13 | Stop reason: ALTCHOICE

## 2020-10-23 RX ORDER — PREDNISONE 20 MG/1
20 TABLET ORAL 2 TIMES DAILY
Qty: 10 TABLET | Refills: 0 | Status: SHIPPED | OUTPATIENT
Start: 2020-10-23 | End: 2021-11-08 | Stop reason: ALTCHOICE

## 2020-10-23 NOTE — PROGRESS NOTES
Left arm pain arms been hurting for a week.  Wrist pain off and on for a year.  Left posterior proximal forearm down to the distal forearm.  Hoping a water bottle will hurt.  Went to urgent care had a steroid shot this morning.  No injury.  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  Worse 247 between work and school.  She is a .  Also the right wrist has a volar not.  Using ice and heat which helped.  But the pain returns.  This is on longer-term pain.    Physical examination left elbow very tender posterior medially.  Cannot pronate or supinate without pain.   is decreased.  Wrist extension okay.  Cannot flex or extend the elbow.  Tearful.  No skin sensitivity.  Right volar lateral wrist 1 cm nodule nontender range of motion is okay.  This may well be a ganglion.    Impression left elbow pain.  Right wrist mass possible ganglion.    X-ray the right wrist in the left elbow.  To orthopedics if there is anything abnormal.  Try prednisone 20 mg 2 per day for 5 days.

## 2020-10-26 ENCOUNTER — OFFICE VISIT (OUTPATIENT)
Dept: FAMILY MEDICINE | Facility: CLINIC | Age: 35
End: 2020-10-26
Payer: COMMERCIAL

## 2020-10-26 ENCOUNTER — OFFICE VISIT (OUTPATIENT)
Dept: OBSTETRICS AND GYNECOLOGY | Facility: CLINIC | Age: 35
End: 2020-10-26
Payer: COMMERCIAL

## 2020-10-26 VITALS
SYSTOLIC BLOOD PRESSURE: 136 MMHG | WEIGHT: 247 LBS | BODY MASS INDEX: 43.77 KG/M2 | TEMPERATURE: 98 F | OXYGEN SATURATION: 98 % | DIASTOLIC BLOOD PRESSURE: 88 MMHG | HEART RATE: 68 BPM | HEIGHT: 63 IN

## 2020-10-26 VITALS
BODY MASS INDEX: 44.18 KG/M2 | SYSTOLIC BLOOD PRESSURE: 124 MMHG | WEIGHT: 249.31 LBS | DIASTOLIC BLOOD PRESSURE: 82 MMHG | HEIGHT: 63 IN

## 2020-10-26 DIAGNOSIS — M25.522 LEFT ELBOW PAIN: Primary | ICD-10-CM

## 2020-10-26 DIAGNOSIS — Z01.419 ENCOUNTER FOR GYNECOLOGICAL EXAMINATION (GENERAL) (ROUTINE) WITHOUT ABNORMAL FINDINGS: Primary | ICD-10-CM

## 2020-10-26 PROCEDURE — 99999 PR PBB SHADOW E&M-EST. PATIENT-LVL III: ICD-10-PCS | Mod: PBBFAC,,, | Performed by: OBSTETRICS & GYNECOLOGY

## 2020-10-26 PROCEDURE — 99395 PR PREVENTIVE VISIT,EST,18-39: ICD-10-PCS | Mod: S$GLB,,, | Performed by: OBSTETRICS & GYNECOLOGY

## 2020-10-26 PROCEDURE — 99213 OFFICE O/P EST LOW 20 MIN: CPT | Mod: S$GLB,,, | Performed by: FAMILY MEDICINE

## 2020-10-26 PROCEDURE — 99999 PR PBB SHADOW E&M-EST. PATIENT-LVL III: CPT | Mod: PBBFAC,,, | Performed by: OBSTETRICS & GYNECOLOGY

## 2020-10-26 PROCEDURE — 3008F BODY MASS INDEX DOCD: CPT | Mod: CPTII,S$GLB,, | Performed by: FAMILY MEDICINE

## 2020-10-26 PROCEDURE — 99395 PREV VISIT EST AGE 18-39: CPT | Mod: S$GLB,,, | Performed by: OBSTETRICS & GYNECOLOGY

## 2020-10-26 PROCEDURE — 99213 PR OFFICE/OUTPT VISIT, EST, LEVL III, 20-29 MIN: ICD-10-PCS | Mod: S$GLB,,, | Performed by: FAMILY MEDICINE

## 2020-10-26 PROCEDURE — 3008F PR BODY MASS INDEX (BMI) DOCUMENTED: ICD-10-PCS | Mod: CPTII,S$GLB,, | Performed by: FAMILY MEDICINE

## 2020-10-26 NOTE — PROGRESS NOTES
C/o vaginal discharge after cycle, but none currently  Nexplaon  Chief Complaint   Patient presents with    Well Woman     c/o vaginal odor after pperiods, which are irregular due to Nexplanon       History and Physical:  Sotero Pelaez is a 35 y.o. F who presents today for her routine annual GYN exam.     No LMP recorded. Patient has had an implant.  Contraception: Nexplanon  Menstrual cycle: irregular  Last Pap: 2019 normal  History of abnormal pap: denies  Immunization status: stated as current, but no records available.   Body mass index is 44.17 kg/m².    Allergies: Review of patient's allergies indicates:  No Known Allergies  Past Medical History:   Diagnosis Date    Abnormal Pap smear     No further treatment per patient.  Recheck normal per patient.    Anemia     Asthma     since younger    Herpes simplex without mention of complication      Past Surgical History:   Procedure Laterality Date    EYE SURGERY       MEDS:   Current Outpatient Medications on File Prior to Visit   Medication Sig Dispense Refill    etonogestrel (NEXPLANON) 68 mg Impl by Subdermal route.      ibuprofen (ADVIL,MOTRIN) 800 MG tablet       predniSONE (DELTASONE) 20 MG tablet Take 1 tablet (20 mg total) by mouth 2 (two) times daily. 10 tablet 0    traMADoL (ULTRAM) 50 mg tablet        No current facility-administered medications on file prior to visit.      OB History        3    Para   2    Term   2            AB   1    Living   2       SAB   0    TAB        Ectopic        Multiple        Live Births   2               Social History     Socioeconomic History    Marital status: Single     Spouse name: Not on file    Number of children: Not on file    Years of education: Not on file    Highest education level: Not on file   Occupational History    Not on file   Social Needs    Financial resource strain: Not on file    Food insecurity     Worry: Not on file     Inability: Not on file    Transportation  "needs     Medical: Not on file     Non-medical: Not on file   Tobacco Use    Smoking status: Never Smoker    Smokeless tobacco: Never Used   Substance and Sexual Activity    Alcohol use: Yes     Comment: Seldomly    Drug use: No    Sexual activity: Never     Partners: Male     Birth control/protection: None, Implant     Comment: nexplanon   Lifestyle    Physical activity     Days per week: Not on file     Minutes per session: Not on file    Stress: Not on file   Relationships    Social connections     Talks on phone: Not on file     Gets together: Not on file     Attends Tenriism service: Not on file     Active member of club or organization: Not on file     Attends meetings of clubs or organizations: Not on file     Relationship status: Not on file   Other Topics Concern    Not on file   Social History Narrative    Not on file     Family History   Problem Relation Age of Onset    Breast cancer Maternal Aunt     Cancer Maternal Aunt     Diabetes Maternal Aunt     Hypertension Mother     Diabetes Mother     Stroke Sister     Colon cancer Neg Hx     Ovarian cancer Neg Hx        Past medical and surgical history reviewed.   I have reviewed the patient's medical history in detail and updated the computerized patient record.    Review of System:  General: no chills, fever, night sweats, weight gain or weight loss  Breasts: no new or changing breast lumps, nipple discharge or masses.  Gastrointestinal: no abdominal pain, change in bowel habits, or black or bloody stools  Genito-Urinary: no incontinence, urinary frequency/urgency or vulvar/vaginal symptoms, pelvic pain or abnormal vaginal bleeding.    Physical Exam:   /82   Ht 5' 3" (1.6 m)   Wt 113.1 kg (249 lb 5.4 oz)   BMI 44.17 kg/m²   Constitutional: She appears alert and responsive. She appears well-developed, well-groomed, and well-nourished. No distress.  Breasts: Deferred, no breast complaints and recent exam.  Abdominal: Soft. She " exhibits no distension, hernias or masses. There is no tenderness. No enlargement of liver edge or spleen.  There is no rebound and no guarding.   Genitourinary: Deferred, no current GYN complaints    Assessment/Plan:   Encounter for gynecological examination (general) (routine) without abnormal findings    Pap not indicated at this time.     Admits to vaginal discharge after her cycles, which are irregular do to Nexplanon. No current bleeding, no currrent discharge, no current odor.   Advised to return when she has symptoms for testing. Advised a normal discharge can have an odor, but it is best to be tested when she has symptoms.     Follow up in 1 year.

## 2020-10-27 ENCOUNTER — OFFICE VISIT (OUTPATIENT)
Dept: ORTHOPEDICS | Facility: CLINIC | Age: 35
End: 2020-10-27
Payer: COMMERCIAL

## 2020-10-27 VITALS — WEIGHT: 247 LBS | HEIGHT: 63 IN | BODY MASS INDEX: 43.77 KG/M2 | RESPIRATION RATE: 17 BRPM

## 2020-10-27 DIAGNOSIS — M25.522 LEFT ELBOW PAIN: Primary | ICD-10-CM

## 2020-10-27 PROCEDURE — 99999 PR PBB SHADOW E&M-EST. PATIENT-LVL III: CPT | Mod: PBBFAC,,, | Performed by: ORTHOPAEDIC SURGERY

## 2020-10-27 PROCEDURE — 20605 INTERMEDIATE JOINT ASPIRATION/INJECTION: ICD-10-PCS | Mod: LT,S$GLB,, | Performed by: ORTHOPAEDIC SURGERY

## 2020-10-27 PROCEDURE — 99203 OFFICE O/P NEW LOW 30 MIN: CPT | Mod: 25,S$GLB,, | Performed by: ORTHOPAEDIC SURGERY

## 2020-10-27 PROCEDURE — 99203 PR OFFICE/OUTPT VISIT, NEW, LEVL III, 30-44 MIN: ICD-10-PCS | Mod: 25,S$GLB,, | Performed by: ORTHOPAEDIC SURGERY

## 2020-10-27 PROCEDURE — 20605 DRAIN/INJ JOINT/BURSA W/O US: CPT | Mod: LT,S$GLB,, | Performed by: ORTHOPAEDIC SURGERY

## 2020-10-27 PROCEDURE — 3008F BODY MASS INDEX DOCD: CPT | Mod: CPTII,S$GLB,, | Performed by: ORTHOPAEDIC SURGERY

## 2020-10-27 PROCEDURE — 99999 PR PBB SHADOW E&M-EST. PATIENT-LVL III: ICD-10-PCS | Mod: PBBFAC,,, | Performed by: ORTHOPAEDIC SURGERY

## 2020-10-27 PROCEDURE — 3008F PR BODY MASS INDEX (BMI) DOCUMENTED: ICD-10-PCS | Mod: CPTII,S$GLB,, | Performed by: ORTHOPAEDIC SURGERY

## 2020-10-27 RX ADMIN — TRIAMCINOLONE ACETONIDE 40 MG: 40 INJECTION, SUSPENSION INTRA-ARTICULAR; INTRAMUSCULAR at 09:10

## 2020-10-27 NOTE — PROGRESS NOTES
Hello pain is slightly better with the prednisone but still painful.  She is better able to supinate and pronate.  But cannot extend the arm.  The wrist is doing okay.    Physical examination.  Left elbow .  Cannot extend fully.  Some pain with flexion.  Better able to supinate [place].  No swelling.    Reviewed the x-ray with her it does show a 3 x 7 mm piece of bone separate from the elbow.  I am not sure whether this represents an old fracture or what.    Impression left elbow pain.  Probable ganglion the right wrist.    Plan gave her a note for work for light duty.  She works from home.  For the [employer] some sure this will require forms for work modifications.  Sent her to see orthopedics.  Dr. Horner.

## 2020-10-29 RX ORDER — TRIAMCINOLONE ACETONIDE 40 MG/ML
40 INJECTION, SUSPENSION INTRA-ARTICULAR; INTRAMUSCULAR
Status: DISCONTINUED | OUTPATIENT
Start: 2020-10-27 | End: 2020-10-29 | Stop reason: HOSPADM

## 2020-10-29 NOTE — PROGRESS NOTES
Past Medical History:   Diagnosis Date    Abnormal Pap smear     No further treatment per patient.  Recheck normal per patient.    Anemia     Asthma     since younger    Herpes simplex without mention of complication        Past Surgical History:   Procedure Laterality Date    EYE SURGERY         Current Outpatient Medications   Medication Sig    etonogestrel (NEXPLANON) 68 mg Impl by Subdermal route.    ibuprofen (ADVIL,MOTRIN) 800 MG tablet     predniSONE (DELTASONE) 20 MG tablet Take 1 tablet (20 mg total) by mouth 2 (two) times daily.    traMADoL (ULTRAM) 50 mg tablet      No current facility-administered medications for this visit.        Review of patient's allergies indicates:  No Known Allergies    Family History   Problem Relation Age of Onset    Breast cancer Maternal Aunt     Cancer Maternal Aunt     Diabetes Maternal Aunt     Hypertension Mother     Diabetes Mother     Stroke Sister     Colon cancer Neg Hx     Ovarian cancer Neg Hx        Social History     Socioeconomic History    Marital status: Single     Spouse name: Not on file    Number of children: Not on file    Years of education: Not on file    Highest education level: Not on file   Occupational History    Not on file   Social Needs    Financial resource strain: Not on file    Food insecurity     Worry: Not on file     Inability: Not on file    Transportation needs     Medical: Not on file     Non-medical: Not on file   Tobacco Use    Smoking status: Never Smoker    Smokeless tobacco: Never Used   Substance and Sexual Activity    Alcohol use: Yes     Comment: Seldomly    Drug use: No    Sexual activity: Never     Partners: Male     Birth control/protection: None, Implant     Comment: nexplanon   Lifestyle    Physical activity     Days per week: Not on file     Minutes per session: Not on file    Stress: Not on file   Relationships    Social connections     Talks on phone: Not on file     Gets together: Not on  "file     Attends Jew service: Not on file     Active member of club or organization: Not on file     Attends meetings of clubs or organizations: Not on file     Relationship status: Not on file   Other Topics Concern    Not on file   Social History Narrative    Not on file       Chief Complaint:   Chief Complaint   Patient presents with    Left Elbow - Pain       Consulting Physician: Self, Aaareferral    History of present illness:    This is a 35 y.o. year old female who complains of left elbow pain for 1 week. Reports she woke up with pain. Denies injury. Pain 5/10 and worse with use.    Review of Systems:    Constitution:   Denies chills, fever, and sweats.  HENT:   Denies headaches or blurry vision.  Cardiovascular:  Denies chest pain or irregular heart beat.  Respiratory:   Denies cough or shortness of breath.  Gastrointestinal:  Denies abdominal pain, nausea, or vomiting.  Musculoskeletal:   Denies muscle cramps.  Neurological:   Denies dizziness or focal weakness.  Psychiatric/Behavior: Normal mental status.  Hematology/Lymph:  Denies bleeding problem or easy bruising/bleeding.  Skin:    Denies rash or suspicious lesions.    Examination:    Vital Signs:    Vitals:    10/27/20 0859   Resp: 17   Weight: 112 kg (247 lb)   Height: 5' 3" (1.6 m)   PainSc:   5       Body mass index is 43.75 kg/m².    Constitution:   Well-developed, well nourished patient in no acute distress.  Neurological:   Alert and oriented x 3 and cooperative to examination.     Psychiatric/Behavior: Normal mental status.  Respiratory:   No shortness of breath.  Eyes:    Extraoccular muscles intact  Skin:    No scars, rash or suspicious lesions.    Physical Exam: Left Elbow Exam    Skin  Scars:   None  Rash:   None    Inspection  Erythema:  None  Bruising:  None  Swelling:  None  Masses:  None  Lymphadenopathy: None    Range of Motion  Flexion:  120°  Extension:  10°  Supination:  80°  Pronation:  80°    Tenderness  Medial " Epicondyle: mild  Lateral Epicondyle: None  Olecranon:  None    Stability  Valgus Stress:  Stable  Varus Stress:  Stable    Strength:  Normal  Sensation:  Intact  Cubital Tinel's:     positive    Vascular  Pulses:  Palpable  Capillary Refill: Normal          Imaging: X-rays ordered and images interpreted today personally by me of left elbow show a small calcific density over medial elbow. Appears old.         Assessment: Left elbow pain  -     Intermediate Joint Aspiration/Injection        Plan:  She had no injury. Reports positive tinel. Non-tender over bone fragment. Will inject cubital tunnel and see how she does. EMG if not better.      DISCLAIMER: This note may have been dictated using voice recognition software and may contain grammatical errors.     NOTE: Consult report sent to referring provider via [facility] EMR.

## 2020-10-29 NOTE — PROCEDURES
L medial epicondyleIntermediate Joint Aspiration/Injection    Date/Time: 10/27/2020 9:30 AM  Performed by: Mariano Horner MD  Authorized by: Mariano Horner MD     Consent Done?: Yes (Verbal)  Indications:  Pain  Timeout: Prior to procedure the correct patient, procedure, and site was verified      Location:  Elbow  Approach:  Posterior  Medications:  40 mg triamcinolone acetonide 40 mg/mL

## 2020-11-09 ENCOUNTER — HOSPITAL ENCOUNTER (EMERGENCY)
Facility: HOSPITAL | Age: 35
Discharge: HOME | End: 2020-11-09
Attending: EMERGENCY MEDICINE
Payer: COMMERCIAL

## 2020-11-09 VITALS
TEMPERATURE: 99 F | HEIGHT: 63 IN | WEIGHT: 250 LBS | RESPIRATION RATE: 16 BRPM | SYSTOLIC BLOOD PRESSURE: 154 MMHG | HEART RATE: 84 BPM | DIASTOLIC BLOOD PRESSURE: 93 MMHG | BODY MASS INDEX: 44.3 KG/M2 | OXYGEN SATURATION: 98 %

## 2020-11-09 DIAGNOSIS — M25.529 ELBOW PAIN, UNSPECIFIED LATERALITY: Primary | ICD-10-CM

## 2020-11-09 DIAGNOSIS — R52 PAIN: ICD-10-CM

## 2020-11-09 LAB
B-HCG UR QL: NEGATIVE
CTP QC/QA: YES

## 2020-11-09 PROCEDURE — 99284 EMERGENCY DEPT VISIT MOD MDM: CPT | Mod: 25

## 2020-11-09 PROCEDURE — 81025 URINE PREGNANCY TEST: CPT | Performed by: NURSE PRACTITIONER

## 2020-11-10 ENCOUNTER — OFFICE VISIT (OUTPATIENT)
Dept: ORTHOPEDICS | Facility: CLINIC | Age: 35
End: 2020-11-10
Payer: COMMERCIAL

## 2020-11-10 VITALS — HEIGHT: 63 IN | BODY MASS INDEX: 44.3 KG/M2 | RESPIRATION RATE: 15 BRPM | WEIGHT: 250 LBS

## 2020-11-10 DIAGNOSIS — M25.522 LEFT ELBOW PAIN: Primary | ICD-10-CM

## 2020-11-10 PROCEDURE — 3008F PR BODY MASS INDEX (BMI) DOCUMENTED: ICD-10-PCS | Mod: CPTII,S$GLB,, | Performed by: ORTHOPAEDIC SURGERY

## 2020-11-10 PROCEDURE — 99213 OFFICE O/P EST LOW 20 MIN: CPT | Mod: S$GLB,,, | Performed by: ORTHOPAEDIC SURGERY

## 2020-11-10 PROCEDURE — 1125F PR PAIN SEVERITY QUANTIFIED, PAIN PRESENT: ICD-10-PCS | Mod: S$GLB,,, | Performed by: ORTHOPAEDIC SURGERY

## 2020-11-10 PROCEDURE — 99999 PR PBB SHADOW E&M-EST. PATIENT-LVL III: CPT | Mod: PBBFAC,,, | Performed by: ORTHOPAEDIC SURGERY

## 2020-11-10 PROCEDURE — 99213 PR OFFICE/OUTPT VISIT, EST, LEVL III, 20-29 MIN: ICD-10-PCS | Mod: S$GLB,,, | Performed by: ORTHOPAEDIC SURGERY

## 2020-11-10 PROCEDURE — 99999 PR PBB SHADOW E&M-EST. PATIENT-LVL III: ICD-10-PCS | Mod: PBBFAC,,, | Performed by: ORTHOPAEDIC SURGERY

## 2020-11-10 PROCEDURE — 3008F BODY MASS INDEX DOCD: CPT | Mod: CPTII,S$GLB,, | Performed by: ORTHOPAEDIC SURGERY

## 2020-11-10 PROCEDURE — 1125F AMNT PAIN NOTED PAIN PRSNT: CPT | Mod: S$GLB,,, | Performed by: ORTHOPAEDIC SURGERY

## 2020-11-10 NOTE — FIRST PROVIDER EVALUATION
"Medical screening exam completed.  I have conducted a focused provider triage encounter, findings are as follows:    Brief history of present illness:  35 year old female presents to the ER with complaints of ongoing left elbow pain for the past 2 weeks. She states she was told 2 weeks ago she had a fracture. Has not seen orthopedist. Denies any known prior traumatic injury. Reports elbow pain has worsened. Requesting Mri     Vitals:    11/09/20 1823   BP: 131/84   BP Location: Right arm   Patient Position: Sitting   Pulse: 98   Resp: 18   Temp: 98.7 °F (37.1 °C)   TempSrc: Oral   SpO2: 100%   Weight: 113.4 kg (250 lb)   Height: 5' 3" (1.6 m)       Pertinent physical exam:  Elbow non swollen, non erythematous. Pain with passive flexion and extension.     Brief workup plan:  L elbow xr     Preliminary workup initiated; this workup will be continued and followed by the physician or advanced practice provider that is assigned to the patient when roomed.  "

## 2020-11-10 NOTE — ED PROVIDER NOTES
Encounter Date: 11/9/2020       History     Chief Complaint   Patient presents with    Elbow Pain     LEFT , DENIES FALL / INJURY     35-year-old well-appearing female presents emergency department with complaint of nontraumatic left upper extremity pain patient states that her pain is proximal to her elbow all the way down to her finger she states movement increases the pain she was recently seen by her primary care provider and Dr. garves a local orthopedist and had a steroid shot in her elbow.  The patient denies any fevers a worsening symptoms since the steroid shot.  Patient is right-hand dominant        Review of patient's allergies indicates:  No Known Allergies  Past Medical History:   Diagnosis Date    Abnormal Pap smear     No further treatment per patient.  Recheck normal per patient.    Anemia     Asthma     since younger    Herpes simplex without mention of complication     Obese      Past Surgical History:   Procedure Laterality Date    EYE SURGERY       Family History   Problem Relation Age of Onset    Breast cancer Maternal Aunt     Cancer Maternal Aunt     Diabetes Maternal Aunt     Hypertension Mother     Diabetes Mother     Stroke Sister     Colon cancer Neg Hx     Ovarian cancer Neg Hx      Social History     Tobacco Use    Smoking status: Never Smoker    Smokeless tobacco: Never Used   Substance Use Topics    Alcohol use: Yes     Comment: Seldomly    Drug use: No     Review of Systems   Constitutional: Negative.    HENT: Negative.    Respiratory: Negative.    Cardiovascular: Negative.    Gastrointestinal: Negative.    Genitourinary: Negative.    Musculoskeletal:        Left elbow pain   All other systems reviewed and are negative.      Physical Exam     Initial Vitals [11/09/20 1823]   BP Pulse Resp Temp SpO2   131/84 98 18 98.7 °F (37.1 °C) 100 %      MAP       --         Physical Exam    Nursing note and vitals reviewed.  Constitutional: She appears well-developed and  well-nourished.   Musculoskeletal:      Left elbow: She exhibits decreased range of motion. She exhibits no swelling, no effusion, no deformity and no laceration. Tenderness found.        Arms:       Comments: Patient has mild tenderness to the pronate and supinate, neurovascular status is intact distal pulses noted there is no evidence of swelling to the joint or erythema patient has no fevers         ED Course   Splint Application    Date/Time: 11/9/2020 9:10 PM  Performed by: ELLIE Diop  Authorized by: Afshin Parker MD   Location details: left elbow  Post-procedure: The splinted body part was neurovascularly unchanged following the procedure.  Patient tolerance: Patient tolerated the procedure well with no immediate complications  Comments: Sling applied to left elbow        Labs Reviewed   POCT URINE PREGNANCY          Imaging Results          US Upper Extremity Veins Left (Final result)  Result time 11/09/20 19:52:25    Final result by Bruce Chaudhari MD (11/09/20 19:24:00)                 Narrative:    EXAM DESCRIPTION: US UPPER EXTREMITY VEINS LEFT 11/9/2020 8:35 PM CST    CLINICAL HISTORY: 35 years, Female, pain to left arm    COMPARISON: None.    FINDINGS:    Multiple grayscale images as well as duplex Doppler ultrasound of left upper extremity were performed.  Left internal jugular vein, left brachial cephalic vein, left subclavian vein, left axillary vein, left brachial vein, left radial vein at the wrist, left ulnar vein at the wrist were imaged.  Spectral waveform demonstrate normal compressibility, phasicity and augmentation.  No intraluminal defects were seen. The left cephalic vein and left basilic vein demonstrate to be patent.    IMPRESSION:    NO EVIDENCE FOR DEEP VEIN THROMBOSIS OF THE LEFT UPPER EXTREMITY.    Electronically signed by:  Bruce Chaudhari MD  11/9/2020 8:36 PM CST Workstation: [id number]                             X-Ray Elbow Complete Left (Final result)  Result time  11/09/20 19:22:31    Final result by Abran Anderson MD (11/09/20 19:22:31)                 Narrative:    REASON: Left elbow pain.    TECHNIQUE: Multiple radiographic views of the left elbow.    COMPARISON: None.    FINDINGS:    Calcific densities noted adjacent to the medial epicondyle may reflect  loose bodies or age-indeterminate avulsion fragments. The joint spaces  of the elbow are preserved. No elbow joint effusion. No gross soft  tissue abnormality.    IMPRESSION:    Calcified densities adjacent to the medial epicondyle may reflect  loose bodies or age-indeterminate avulsion fracture. Correlate with  point tenderness.    Electronically Signed by Abran Anderson on 11/9/2020 7:31 PM                               Medical Decision Making:   Initial Assessment:   Elbow pain   Differential Diagnosis:   Lateral epicondylitis, fracture, DVT septic joint  ED Management:  Patient presents to the emergency department with complaint of nontraumatic left elbow pain x-ray reveals calcified area does not appear as a new avulsion fracture, possible calcific tendinitis.  Patient had a recent steroid injection there is no evidence of joint effusion, septic joint as patient has no decreased range of motion a my exam she states it is painful with she is able to range she has no erythema or swelling over the joint she has had no fevers.  Ultrasound performed there is no evidence of DVT patient will be placed in a sling referred back to orthopedist for definitive care, possible outpatient MRI or physical therapy if condition does not improve                             Clinical Impression:       ICD-10-CM ICD-9-CM   1. Elbow pain, unspecified laterality  M25.529 719.42   2. Pain  R52 780.96                          ED Disposition Condition    Discharge Stable        ED Prescriptions     None        Follow-up Information     Follow up With Specialties Details Why Contact Info    Mariano Horner MD Sports Medicine, Orthopedic  Surgery Schedule an appointment as soon as possible for a visit in 3 days  [address] [place] [address]  [address]  [facility] [address]  [phone]                                         ELLIE Diop  11/09/20 2109       ELLIE Diop  11/09/20 2110

## 2020-11-12 NOTE — PROGRESS NOTES
Past Medical History:   Diagnosis Date    Abnormal Pap smear     No further treatment per patient.  Recheck normal per patient.    Anemia     Asthma     since younger    Herpes simplex without mention of complication     Obese        Past Surgical History:   Procedure Laterality Date    EYE SURGERY         Current Outpatient Medications   Medication Sig    etonogestrel (NEXPLANON) 68 mg Impl by Subdermal route.    ibuprofen (ADVIL,MOTRIN) 800 MG tablet     traMADoL (ULTRAM) 50 mg tablet     predniSONE (DELTASONE) 20 MG tablet Take 1 tablet (20 mg total) by mouth 2 (two) times daily.     No current facility-administered medications for this visit.        Review of patient's allergies indicates:  No Known Allergies    Family History   Problem Relation Age of Onset    Breast cancer Maternal Aunt     Cancer Maternal Aunt     Diabetes Maternal Aunt     Hypertension Mother     Diabetes Mother     Stroke Sister     Colon cancer Neg Hx     Ovarian cancer Neg Hx        Social History     Socioeconomic History    Marital status: Single     Spouse name: Not on file    Number of children: Not on file    Years of education: Not on file    Highest education level: Not on file   Occupational History    Not on file   Social Needs    Financial resource strain: Not on file    Food insecurity     Worry: Not on file     Inability: Not on file    Transportation needs     Medical: Not on file     Non-medical: Not on file   Tobacco Use    Smoking status: Never Smoker    Smokeless tobacco: Never Used   Substance and Sexual Activity    Alcohol use: Yes     Comment: Seldomly    Drug use: No    Sexual activity: Never     Partners: Male     Birth control/protection: None, Implant     Comment: nexplanon   Lifestyle    Physical activity     Days per week: Not on file     Minutes per session: Not on file    Stress: Not on file   Relationships    Social connections     Talks on phone: Not on file     Gets  "together: Not on file     Attends Buddhism service: Not on file     Active member of club or organization: Not on file     Attends meetings of clubs or organizations: Not on file     Relationship status: Not on file   Other Topics Concern    Not on file   Social History Narrative    Not on file       Chief Complaint:   Chief Complaint   Patient presents with    Left Elbow - Pain       Consulting Physician: No ref. provider found    History of present illness:    This is a 35 y.o. year old female who complains of left elbow pain since Friday. Reports she woke up with pain. Denies injury. Pain 8/10 and worse with use.  She reports that her entire hand is numb.  She states that the injection we gave her at her last visit completely relieved her pain and symptoms.    Review of Systems:    Constitution:   Denies chills, fever, and sweats.  HENT:   Denies headaches or blurry vision.  Cardiovascular:  Denies chest pain or irregular heart beat.  Respiratory:   Denies cough or shortness of breath.  Gastrointestinal:  Denies abdominal pain, nausea, or vomiting.  Musculoskeletal:   Denies muscle cramps.  Neurological:   Denies dizziness or focal weakness.  Psychiatric/Behavior: Normal mental status.  Hematology/Lymph:  Denies bleeding problem or easy bruising/bleeding.  Skin:    Denies rash or suspicious lesions.    Examination:    Vital Signs:    Vitals:    11/10/20 0909   Resp: 15   Weight: 113.4 kg (250 lb)   Height: 5' 3" (1.6 m)   PainSc:   8   PainLoc: Elbow       Body mass index is 44.29 kg/m².    Constitution:   Well-developed, well nourished patient in no acute distress.  Neurological:   Alert and oriented x 3 and cooperative to examination.     Psychiatric/Behavior: Normal mental status.  Respiratory:   No shortness of breath.  Eyes:    Extraoccular muscles intact  Skin:    No scars, rash or suspicious lesions.    Physical Exam: Left Elbow " Exam    Skin  Scars:   None  Rash:   None    Inspection  Erythema:  None  Bruising:  None  Swelling:  None  Masses:  None  Lymphadenopathy: None    Range of Motion  Flexion:  120°  Extension:  10°  Supination:  80°  Pronation:  80°    Tenderness  Medial Epicondyle: mild  Lateral Epicondyle: None  Olecranon:  None    Stability  Valgus Stress:  Stable  Varus Stress:  Stable    Strength:  Normal  Sensation:  Intact  Cubital Tinel's:     positive    Vascular  Pulses:  Palpable  Capillary Refill: Normal          Imaging: X-rays of left elbow show a small calcific density over medial elbow that appears old.         Assessment: Left elbow pain        Plan:  Her pain has returned and she reports that her hand is numb.  I will refer her to see my partner Dr. Nath for treatment and possible EMG/NCS.    DISCLAIMER: This note may have been dictated using voice recognition software and may contain grammatical errors.     NOTE: Consult report sent to referring provider via [employer] EMR.

## 2020-11-13 ENCOUNTER — OFFICE VISIT (OUTPATIENT)
Dept: PHYSICAL MEDICINE AND REHAB | Facility: CLINIC | Age: 35
End: 2020-11-13
Payer: COMMERCIAL

## 2020-11-13 VITALS
HEIGHT: 63 IN | SYSTOLIC BLOOD PRESSURE: 124 MMHG | BODY MASS INDEX: 44.3 KG/M2 | WEIGHT: 250 LBS | HEART RATE: 87 BPM | DIASTOLIC BLOOD PRESSURE: 81 MMHG

## 2020-11-13 DIAGNOSIS — M25.522 LEFT ELBOW PAIN: ICD-10-CM

## 2020-11-13 DIAGNOSIS — M65.222 CALCIFIC TENDINITIS OF LEFT ELBOW: ICD-10-CM

## 2020-11-13 DIAGNOSIS — M77.02 MEDIAL EPICONDYLITIS OF ELBOW, LEFT: Primary | ICD-10-CM

## 2020-11-13 PROCEDURE — 99999 PR PBB SHADOW E&M-EST. PATIENT-LVL III: ICD-10-PCS | Mod: PBBFAC,,, | Performed by: PHYSICAL MEDICINE & REHABILITATION

## 2020-11-13 PROCEDURE — 1125F PR PAIN SEVERITY QUANTIFIED, PAIN PRESENT: ICD-10-PCS | Mod: S$GLB,,, | Performed by: PHYSICAL MEDICINE & REHABILITATION

## 2020-11-13 PROCEDURE — 3008F PR BODY MASS INDEX (BMI) DOCUMENTED: ICD-10-PCS | Mod: CPTII,S$GLB,, | Performed by: PHYSICAL MEDICINE & REHABILITATION

## 2020-11-13 PROCEDURE — 3008F BODY MASS INDEX DOCD: CPT | Mod: CPTII,S$GLB,, | Performed by: PHYSICAL MEDICINE & REHABILITATION

## 2020-11-13 PROCEDURE — 99204 OFFICE O/P NEW MOD 45 MIN: CPT | Mod: S$GLB,,, | Performed by: PHYSICAL MEDICINE & REHABILITATION

## 2020-11-13 PROCEDURE — 1125F AMNT PAIN NOTED PAIN PRSNT: CPT | Mod: S$GLB,,, | Performed by: PHYSICAL MEDICINE & REHABILITATION

## 2020-11-13 PROCEDURE — 99204 PR OFFICE/OUTPT VISIT, NEW, LEVL IV, 45-59 MIN: ICD-10-PCS | Mod: S$GLB,,, | Performed by: PHYSICAL MEDICINE & REHABILITATION

## 2020-11-13 PROCEDURE — 99999 PR PBB SHADOW E&M-EST. PATIENT-LVL III: CPT | Mod: PBBFAC,,, | Performed by: PHYSICAL MEDICINE & REHABILITATION

## 2020-11-13 RX ORDER — CELECOXIB 100 MG/1
100 CAPSULE ORAL 2 TIMES DAILY PRN
Qty: 60 CAPSULE | Refills: 0 | Status: SHIPPED | OUTPATIENT
Start: 2020-11-13 | End: 2021-11-08 | Stop reason: ALTCHOICE

## 2020-11-13 NOTE — LETTER
November 13, 2020      Mariano Horner MD  [address]  [address]  [place] [address]           [place] [facility]  [address]  [place] [address]  Phone: [phone]  Fax: [phone]          Patient: Sotero Pelaez   MR Number: [id number]   YOB: 1985   Date of Visit: 11/13/2020       Dear Dr. Mariano Horner:    Thank you for referring Sotero Pelaez to me for evaluation. Attached you will find relevant portions of my assessment and plan of care.    If you have questions, please do not hesitate to call me. I look forward to following Sotero Pelaez along with you.    Sincerely,    Wali Nath MD    Enclosure  CC:  No Recipients    If you would like to receive this communication electronically, please contact [email or web address] or [phone] to request more information on [employer] Link access.    For providers and/or their staff who would like to refer a patient to [facility], please contact us through our one-stop-shop provider referral line, [facility], at [phone].    If you feel you have received this communication in error or would no longer like to receive these types of communications, please e-mail [email or web address]

## 2020-11-16 ENCOUNTER — HOSPITAL ENCOUNTER (OUTPATIENT)
Dept: RADIOLOGY | Facility: HOSPITAL | Age: 35
Discharge: HOME | End: 2020-11-16
Attending: PHYSICAL MEDICINE & REHABILITATION
Payer: COMMERCIAL

## 2020-11-16 DIAGNOSIS — M25.522 LEFT ELBOW PAIN: ICD-10-CM

## 2020-11-16 PROCEDURE — 73221 MRI JOINT UPR EXTREM W/O DYE: CPT | Mod: TC,PO,LT

## 2020-11-16 NOTE — PROGRESS NOTES
Today's Date: 11/16/2020     Referring Provider: Dr. Mariano Horner     Chief Complaint:   Chief Complaint   Patient presents with    Elbow Pain     left elbow pain       HPI: Sotero Pelaez is a 35 y.o. female  who presents today for evaluation and treatment of left elbow pain as a referral from Dr. Horner.  She states that her pain started approximately 1 month ago without inciting event.  She states that she woke up and developed pain over the left medial elbow.  She had a left common flexor tendon injection which provided about 2 weeks of relief.  She continues to complain of pain over the left medial elbow.  She describes the pain as a sharp stabbing pain.  The pain can radiate into the mainly the thumb and index finger on the dorsal side of the hand, but also the whole hand in general.  Pain is worse with movement and better with using a sling.  She denies any known trauma.    Review of Systems   Constitutional: Negative for chills and fever.   HENT: Negative for congestion and tinnitus.    Eyes: Negative for blurred vision and photophobia.   Respiratory: Negative for shortness of breath and wheezing.    Cardiovascular: Negative for chest pain and palpitations.   Gastrointestinal: Negative for nausea and vomiting.   Genitourinary: Negative for dysuria and frequency.   Musculoskeletal: Positive for joint pain. Negative for myalgias.   Skin: Negative for itching and rash.   Neurological: Positive for tingling, sensory change and weakness. Negative for speech change.   Endo/Heme/Allergies: Negative for environmental allergies. Does not bruise/bleed easily.   Psychiatric/Behavioral: Negative for depression. The patient is not nervous/anxious.         Social History     Socioeconomic History    Marital status: Single     Spouse name: Not on file    Number of children: Not on file    Years of education: Not on file    Highest education level: Not on file   Occupational History    Not on file   Social  Needs    Financial resource strain: Not on file    Food insecurity     Worry: Not on file     Inability: Not on file    Transportation needs     Medical: Not on file     Non-medical: Not on file   Tobacco Use    Smoking status: Never Smoker    Smokeless tobacco: Never Used   Substance and Sexual Activity    Alcohol use: Yes     Comment: Seldomly    Drug use: No    Sexual activity: Never     Partners: Male     Birth control/protection: None, Implant     Comment: nexplanon   Lifestyle    Physical activity     Days per week: Not on file     Minutes per session: Not on file    Stress: Not on file   Relationships    Social connections     Talks on phone: Not on file     Gets together: Not on file     Attends Orthodoxy service: Not on file     Active member of club or organization: Not on file     Attends meetings of clubs or organizations: Not on file     Relationship status: Not on file   Other Topics Concern    Not on file   Social History Narrative    Not on file       Family History   Problem Relation Age of Onset    Breast cancer Maternal Aunt     Cancer Maternal Aunt     Diabetes Maternal Aunt     Hypertension Mother     Diabetes Mother     Stroke Sister     Colon cancer Neg Hx     Ovarian cancer Neg Hx        Current Outpatient Medications on File Prior to Visit   Medication Sig Dispense Refill    etonogestrel (NEXPLANON) 68 mg Impl by Subdermal route.      predniSONE (DELTASONE) 20 MG tablet Take 1 tablet (20 mg total) by mouth 2 (two) times daily. 10 tablet 0    traMADoL (ULTRAM) 50 mg tablet        No current facility-administered medications on file prior to visit.         Review of patient's allergies indicates:  No Known Allergies    Past Surgical History:   Procedure Laterality Date    EYE SURGERY         Past Medical History:   Diagnosis Date    Abnormal Pap smear     No further treatment per patient.  Recheck normal per patient.    Anemia     Asthma     since younger    Herpes  simplex without mention of complication     Obese        Vitals:    11/13/20 1115   BP: 124/81   Pulse: 87       Physical Exam  Constitutional:       Appearance: Normal appearance. She is obese.   HENT:      Head: Normocephalic and atraumatic.      Nose: Nose normal. No congestion.      Mouth/Throat:      Mouth: Mucous membranes are moist.      Pharynx: Oropharynx is clear.   Eyes:      Extraocular Movements: Extraocular movements intact.      Pupils: Pupils are equal, round, and reactive to light.   Pulmonary:      Effort: Pulmonary effort is normal. No respiratory distress.   Abdominal:      General: Abdomen is flat. There is no distension.   Musculoskeletal:      Right lower leg: No edema.      Left lower leg: No edema.   Skin:     General: Skin is warm and dry.      Nails: There is no clubbing.     Neurological:      General: No focal deficit present.      Mental Status: She is alert and oriented to person, place, and time.      Cranial Nerves: Cranial nerves are intact.      Sensory: Sensation is intact.      Motor: Motor function is intact.      Comments: Negative Castaneda's   Psychiatric:         Mood and Affect: Mood and affect normal.         Behavior: Behavior normal.        Right Elbow Exam     Tenderness   The patient is experiencing tenderness in the medial epicondyle.     Range of Motion   Extension: abnormal   Flexion: abnormal   Pronation: abnormal   Supination: abnormal     Comments:  Pain reproduced with resisted wrist and finger flexion             REASON: Left elbow pain.     TECHNIQUE: Multiple radiographic views of the left elbow.     COMPARISON: None.     FINDINGS:     Calcific densities noted adjacent to the medial epicondyle may reflect  loose bodies or age-indeterminate avulsion fragments. The joint spaces  of the elbow are preserved. No elbow joint effusion. No gross soft  tissue abnormality.     IMPRESSION:     Calcified densities adjacent to the medial epicondyle may reflect  loose  bodies or age-indeterminate avulsion fracture. Correlate with  point tenderness.       Medial epicondylitis of elbow, left    Left elbow pain  -     MRI Elbow Joint Without Contrast Left; Future; Expected date: 11/13/2020    Calcific tendinitis of left elbow    Other orders  -     celecoxib (CELEBREX) 100 MG capsule; Take 1 capsule (100 mg total) by mouth 2 (two) times daily as needed for Pain.  Dispense: 60 capsule; Refill: 0           PLAN:   Sotero Pelaez is a 35 y.o. female with left medial elbow pain.  History and physical examination is likely consistent with calcific tendinopathy of the common flexor tendon at the origin of the medial epicondyle of the elbow.  It is very difficult to get an accurate visual assessment using ultrasound given soft tissue as well as inability to maneuver the elbow secondary to pain.  At this time, will order a MRI of the left elbow to 1.  Evaluate for calcific tendinopathy, but also to rule out an avulsion of the common extra flexor tendon especially given the amount of pain that she is in.  For medical management, prescribe her Celebrex 100 mg b.i.d. p.r.n..  She will return to clinic once the MRI is complete to discuss the results        Wali Nath D.O.  Physical Medicine and Rehabilitation          CC: Patients PCP: Valdemar Richmond III, MD  CC: Referring Provider: Dr. Mariano Horner

## 2020-11-18 ENCOUNTER — OFFICE VISIT (OUTPATIENT)
Dept: PHYSICAL MEDICINE AND REHAB | Facility: CLINIC | Age: 35
End: 2020-11-18
Payer: COMMERCIAL

## 2020-11-18 VITALS
SYSTOLIC BLOOD PRESSURE: 119 MMHG | HEIGHT: 63 IN | BODY MASS INDEX: 44.3 KG/M2 | WEIGHT: 250 LBS | HEART RATE: 80 BPM | DIASTOLIC BLOOD PRESSURE: 77 MMHG

## 2020-11-18 DIAGNOSIS — M77.02 MEDIAL EPICONDYLITIS OF ELBOW, LEFT: Primary | ICD-10-CM

## 2020-11-18 DIAGNOSIS — M65.222 CALCIFIC TENDINITIS OF LEFT ELBOW: ICD-10-CM

## 2020-11-18 DIAGNOSIS — M25.522 LEFT ELBOW PAIN: ICD-10-CM

## 2020-11-18 PROCEDURE — 3008F PR BODY MASS INDEX (BMI) DOCUMENTED: ICD-10-PCS | Mod: CPTII,S$GLB,, | Performed by: PHYSICAL MEDICINE & REHABILITATION

## 2020-11-18 PROCEDURE — 99999 PR PBB SHADOW E&M-EST. PATIENT-LVL III: CPT | Mod: PBBFAC,,, | Performed by: PHYSICAL MEDICINE & REHABILITATION

## 2020-11-18 PROCEDURE — 3008F BODY MASS INDEX DOCD: CPT | Mod: CPTII,S$GLB,, | Performed by: PHYSICAL MEDICINE & REHABILITATION

## 2020-11-18 PROCEDURE — 99213 PR OFFICE/OUTPT VISIT, EST, LEVL III, 20-29 MIN: ICD-10-PCS | Mod: S$GLB,,, | Performed by: PHYSICAL MEDICINE & REHABILITATION

## 2020-11-18 PROCEDURE — 1125F PR PAIN SEVERITY QUANTIFIED, PAIN PRESENT: ICD-10-PCS | Mod: S$GLB,,, | Performed by: PHYSICAL MEDICINE & REHABILITATION

## 2020-11-18 PROCEDURE — 99213 OFFICE O/P EST LOW 20 MIN: CPT | Mod: S$GLB,,, | Performed by: PHYSICAL MEDICINE & REHABILITATION

## 2020-11-18 PROCEDURE — 99999 PR PBB SHADOW E&M-EST. PATIENT-LVL III: ICD-10-PCS | Mod: PBBFAC,,, | Performed by: PHYSICAL MEDICINE & REHABILITATION

## 2020-11-18 PROCEDURE — 1125F AMNT PAIN NOTED PAIN PRSNT: CPT | Mod: S$GLB,,, | Performed by: PHYSICAL MEDICINE & REHABILITATION

## 2020-11-18 NOTE — PROGRESS NOTES
Today's Date: 11/18/2020     Referring Provider: No ref. provider found     Chief Complaint:   Chief Complaint   Patient presents with    Follow-up     MRI elbow       HPI: Sotero Pelaez is a 35 y.o. female  who presents today for follow-up for left elbow pain.  She was last seen in clinic about 1 week ago which time I ordered an MRI of the left elbow which showed calcific tendinopathy of the common flexor tendon with acute edema with surrounding tissues.  I prescribed her Celebrex which she has found extremely beneficial for the pain.  Pain is otherwise unchanged in location, duration, or characteristics.  The intensity has diminished significantly with the Celebrex    Review of Systems   Musculoskeletal: Positive for joint pain and myalgias.   All other systems reviewed and are negative.       Social History     Socioeconomic History    Marital status: Single     Spouse name: Not on file    Number of children: Not on file    Years of education: Not on file    Highest education level: Not on file   Occupational History    Not on file   Social Needs    Financial resource strain: Not on file    Food insecurity     Worry: Not on file     Inability: Not on file    Transportation needs     Medical: Not on file     Non-medical: Not on file   Tobacco Use    Smoking status: Never Smoker    Smokeless tobacco: Never Used   Substance and Sexual Activity    Alcohol use: Yes     Comment: Seldomly    Drug use: No    Sexual activity: Never     Partners: Male     Birth control/protection: None, Implant     Comment: nexplanon   Lifestyle    Physical activity     Days per week: Not on file     Minutes per session: Not on file    Stress: Not on file   Relationships    Social connections     Talks on phone: Not on file     Gets together: Not on file     Attends Adventist service: Not on file     Active member of club or organization: Not on file     Attends meetings of clubs or organizations: Not on file      Relationship status: Not on file   Other Topics Concern    Not on file   Social History Narrative    Not on file       Family History   Problem Relation Age of Onset    Breast cancer Maternal Aunt     Cancer Maternal Aunt     Diabetes Maternal Aunt     Hypertension Mother     Diabetes Mother     Stroke Sister     Colon cancer Neg Hx     Ovarian cancer Neg Hx        Current Outpatient Medications on File Prior to Visit   Medication Sig Dispense Refill    celecoxib (CELEBREX) 100 MG capsule Take 1 capsule (100 mg total) by mouth 2 (two) times daily as needed for Pain. 60 capsule 0    etonogestrel (NEXPLANON) 68 mg Impl by Subdermal route.      predniSONE (DELTASONE) 20 MG tablet Take 1 tablet (20 mg total) by mouth 2 (two) times daily. 10 tablet 0    traMADoL (ULTRAM) 50 mg tablet        No current facility-administered medications on file prior to visit.         Review of patient's allergies indicates:  No Known Allergies    Past Surgical History:   Procedure Laterality Date    EYE SURGERY         Past Medical History:   Diagnosis Date    Abnormal Pap smear     No further treatment per patient.  Recheck normal per patient.    Anemia     Asthma     since younger    Herpes simplex without mention of complication     Obese        Vitals:    11/18/20 1453   BP: 119/77   Pulse: 80       Physical Exam  Constitutional:       Appearance: Normal appearance. She is obese.   HENT:      Head: Normocephalic and atraumatic.      Nose: Nose normal. No congestion.      Mouth/Throat:      Mouth: Mucous membranes are moist.      Pharynx: Oropharynx is clear.   Eyes:      Extraocular Movements: Extraocular movements intact.      Pupils: Pupils are equal, round, and reactive to light.   Pulmonary:      Effort: Pulmonary effort is normal. No respiratory distress.   Abdominal:      General: Abdomen is flat. There is no distension.   Skin:     General: Skin is warm and dry.   Neurological:      General: No focal deficit  present.      Mental Status: She is alert and oriented to person, place, and time.   Psychiatric:         Mood and Affect: Mood normal.         Behavior: Behavior normal.        Left Elbow Exam     Tenderness   The patient is experiencing tenderness in the medial epicondyle.     Range of Motion   Extension: abnormal   Flexion: abnormal   Pronation: abnormal   Supination: abnormal     Muscle Strength   Pronation:  4/5     Comments:  Pain with resisted finger and wrist flexion             MRI of the left elbow without contrast     HISTORY: Left-sided elbow pain.     Multiplanar noncontrast imaging is performed.     There is edema within the soft tissues along the medial aspect of the  elbow. This includes the proximal portion of the medial collateral  ligament and proximal fibers of the pronator teres muscle.  Corresponding plain radiographs demonstrate small foci of  mineralization within the soft tissues at this level with the findings  compatible with calcific tendinopathy/ hydroxyapatite deposition  disease. The common flexor and extensor tendons and lateral  ligamentous structures are intact. The remainder of the major elbow  tendons appear unremarkable.     There are mild changes of intramuscular edema involving the distal  fibers of the brachialis and triceps muscles. Subcutaneous soft tissue  edema is observed along the posterior aspect of the elbow. There is a  small elbow joint effusion.     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     Mild intramuscular edema within the distal fibers of the brachialis  and triceps muscles. This finding could relate to low-grade muscle  strain or other etiologies of myositis.     Small joint effusion.     Posterior subcutaneous edema.    Medial epicondylitis of elbow,  left    BMI 40.0-44.9, adult    Left elbow pain    Calcific tendinitis of left elbow           PLAN:   Sotero Pelaez is a 35 y.o. female with left elbow pain.  History, physical examination, MRI findings are consistent with calcific tendinopathy of the left common flexor tendon at the medial epicondyle.  She has had a common flexor tendon injection which provided her with about 2 weeks of excellent relief.  Her pain returned and is otherwise unchanged in location, duration, intensity, or characteristics.  The pain will radiate down her forearm and into the hand.  At this time, I recommend ultrasound-guided percutaneous tenotomy to the common flexor tendon using Tenex to remove the calcific deposit and stimulate tendon remodeling.  Risks, benefits, alternatives were discussed and the patient will notify us when she would like to proceed.      Wali Nath D.O.  Physical Medicine and Rehabilitation          CC: Patients PCP: Valdemar Richmond III, MD  CC: Referring Provider: No ref. provider found

## 2021-07-16 ENCOUNTER — IMMUNIZATION (OUTPATIENT)
Dept: PRIMARY CARE CLINIC | Facility: CLINIC | Age: 36
End: 2021-07-16
Payer: COMMERCIAL

## 2021-07-16 DIAGNOSIS — Z23 NEED FOR VACCINATION: Primary | ICD-10-CM

## 2021-07-16 PROCEDURE — 0001A COVID-19, MRNA, LNP-S, PF, 30 MCG/0.3 ML DOSE VACCINE: CPT | Mod: CV19,S$GLB,, | Performed by: FAMILY MEDICINE

## 2021-07-16 PROCEDURE — 91300 COVID-19, MRNA, LNP-S, PF, 30 MCG/0.3 ML DOSE VACCINE: CPT | Mod: S$GLB,,, | Performed by: FAMILY MEDICINE

## 2021-07-16 PROCEDURE — 0001A COVID-19, MRNA, LNP-S, PF, 30 MCG/0.3 ML DOSE VACCINE: ICD-10-PCS | Mod: CV19,S$GLB,, | Performed by: FAMILY MEDICINE

## 2021-07-16 PROCEDURE — 91300 COVID-19, MRNA, LNP-S, PF, 30 MCG/0.3 ML DOSE VACCINE: ICD-10-PCS | Mod: S$GLB,,, | Performed by: FAMILY MEDICINE

## 2021-07-23 ENCOUNTER — PATIENT OUTREACH (OUTPATIENT)
Dept: ADMINISTRATIVE | Facility: OTHER | Age: 36
End: 2021-07-23

## 2021-07-26 ENCOUNTER — OFFICE VISIT (OUTPATIENT)
Dept: OBSTETRICS AND GYNECOLOGY | Facility: CLINIC | Age: 36
End: 2021-07-26
Payer: COMMERCIAL

## 2021-07-26 VITALS
BODY MASS INDEX: 43.68 KG/M2 | DIASTOLIC BLOOD PRESSURE: 60 MMHG | SYSTOLIC BLOOD PRESSURE: 110 MMHG | WEIGHT: 246.5 LBS | HEIGHT: 63 IN

## 2021-07-26 DIAGNOSIS — N90.89 VULVAL LESION: Primary | ICD-10-CM

## 2021-07-26 PROCEDURE — 1126F PR PAIN SEVERITY QUANTIFIED, NO PAIN PRESENT: ICD-10-PCS | Mod: CPTII,S$GLB,, | Performed by: OBSTETRICS & GYNECOLOGY

## 2021-07-26 PROCEDURE — 3008F BODY MASS INDEX DOCD: CPT | Mod: CPTII,S$GLB,, | Performed by: OBSTETRICS & GYNECOLOGY

## 2021-07-26 PROCEDURE — 3008F PR BODY MASS INDEX (BMI) DOCUMENTED: ICD-10-PCS | Mod: CPTII,S$GLB,, | Performed by: OBSTETRICS & GYNECOLOGY

## 2021-07-26 PROCEDURE — 1126F AMNT PAIN NOTED NONE PRSNT: CPT | Mod: CPTII,S$GLB,, | Performed by: OBSTETRICS & GYNECOLOGY

## 2021-07-26 PROCEDURE — 99213 PR OFFICE/OUTPT VISIT, EST, LEVL III, 20-29 MIN: ICD-10-PCS | Mod: S$GLB,,, | Performed by: OBSTETRICS & GYNECOLOGY

## 2021-07-26 PROCEDURE — 1159F PR MEDICATION LIST DOCUMENTED IN MEDICAL RECORD: ICD-10-PCS | Mod: CPTII,S$GLB,, | Performed by: OBSTETRICS & GYNECOLOGY

## 2021-07-26 PROCEDURE — 99999 PR PBB SHADOW E&M-EST. PATIENT-LVL III: ICD-10-PCS | Mod: PBBFAC,,, | Performed by: OBSTETRICS & GYNECOLOGY

## 2021-07-26 PROCEDURE — 99999 PR PBB SHADOW E&M-EST. PATIENT-LVL III: CPT | Mod: PBBFAC,,, | Performed by: OBSTETRICS & GYNECOLOGY

## 2021-07-26 PROCEDURE — 1159F MED LIST DOCD IN RCRD: CPT | Mod: CPTII,S$GLB,, | Performed by: OBSTETRICS & GYNECOLOGY

## 2021-07-26 PROCEDURE — 99213 OFFICE O/P EST LOW 20 MIN: CPT | Mod: S$GLB,,, | Performed by: OBSTETRICS & GYNECOLOGY

## 2021-08-05 ENCOUNTER — IMMUNIZATION (OUTPATIENT)
Dept: PRIMARY CARE CLINIC | Facility: CLINIC | Age: 36
End: 2021-08-05
Payer: COMMERCIAL

## 2021-08-05 DIAGNOSIS — Z23 NEED FOR VACCINATION: Primary | ICD-10-CM

## 2021-08-05 PROCEDURE — 91300 COVID-19, MRNA, LNP-S, PF, 30 MCG/0.3 ML DOSE VACCINE: ICD-10-PCS | Mod: S$GLB,,, | Performed by: FAMILY MEDICINE

## 2021-08-05 PROCEDURE — 0002A COVID-19, MRNA, LNP-S, PF, 30 MCG/0.3 ML DOSE VACCINE: CPT | Mod: CV19,S$GLB,, | Performed by: FAMILY MEDICINE

## 2021-08-05 PROCEDURE — 0002A COVID-19, MRNA, LNP-S, PF, 30 MCG/0.3 ML DOSE VACCINE: ICD-10-PCS | Mod: CV19,S$GLB,, | Performed by: FAMILY MEDICINE

## 2021-08-05 PROCEDURE — 91300 COVID-19, MRNA, LNP-S, PF, 30 MCG/0.3 ML DOSE VACCINE: CPT | Mod: S$GLB,,, | Performed by: FAMILY MEDICINE

## 2021-10-07 ENCOUNTER — PATIENT MESSAGE (OUTPATIENT)
Dept: OBSTETRICS AND GYNECOLOGY | Facility: CLINIC | Age: 36
End: 2021-10-07

## 2021-11-04 ENCOUNTER — OFFICE VISIT (OUTPATIENT)
Dept: OBSTETRICS AND GYNECOLOGY | Facility: CLINIC | Age: 36
End: 2021-11-04
Payer: COMMERCIAL

## 2021-11-04 VITALS
DIASTOLIC BLOOD PRESSURE: 80 MMHG | BODY MASS INDEX: 42.97 KG/M2 | HEIGHT: 63 IN | SYSTOLIC BLOOD PRESSURE: 118 MMHG | WEIGHT: 242.5 LBS

## 2021-11-04 DIAGNOSIS — Z30.9 ENCOUNTER FOR CONTRACEPTIVE MANAGEMENT, UNSPECIFIED TYPE: Primary | ICD-10-CM

## 2021-11-04 PROCEDURE — 3079F DIAST BP 80-89 MM HG: CPT | Mod: CPTII,S$GLB,, | Performed by: OBSTETRICS & GYNECOLOGY

## 2021-11-04 PROCEDURE — 3074F SYST BP LT 130 MM HG: CPT | Mod: CPTII,S$GLB,, | Performed by: OBSTETRICS & GYNECOLOGY

## 2021-11-04 PROCEDURE — 3079F PR MOST RECENT DIASTOLIC BLOOD PRESSURE 80-89 MM HG: ICD-10-PCS | Mod: CPTII,S$GLB,, | Performed by: OBSTETRICS & GYNECOLOGY

## 2021-11-04 PROCEDURE — 99999 PR PBB SHADOW E&M-EST. PATIENT-LVL III: CPT | Mod: PBBFAC,,, | Performed by: OBSTETRICS & GYNECOLOGY

## 2021-11-04 PROCEDURE — 99999 PR PBB SHADOW E&M-EST. PATIENT-LVL III: ICD-10-PCS | Mod: PBBFAC,,, | Performed by: OBSTETRICS & GYNECOLOGY

## 2021-11-04 PROCEDURE — 3008F PR BODY MASS INDEX (BMI) DOCUMENTED: ICD-10-PCS | Mod: CPTII,S$GLB,, | Performed by: OBSTETRICS & GYNECOLOGY

## 2021-11-04 PROCEDURE — 3008F BODY MASS INDEX DOCD: CPT | Mod: CPTII,S$GLB,, | Performed by: OBSTETRICS & GYNECOLOGY

## 2021-11-04 PROCEDURE — 1159F PR MEDICATION LIST DOCUMENTED IN MEDICAL RECORD: ICD-10-PCS | Mod: CPTII,S$GLB,, | Performed by: OBSTETRICS & GYNECOLOGY

## 2021-11-04 PROCEDURE — 1159F MED LIST DOCD IN RCRD: CPT | Mod: CPTII,S$GLB,, | Performed by: OBSTETRICS & GYNECOLOGY

## 2021-11-04 PROCEDURE — 99213 OFFICE O/P EST LOW 20 MIN: CPT | Mod: S$GLB,,, | Performed by: OBSTETRICS & GYNECOLOGY

## 2021-11-04 PROCEDURE — 99213 PR OFFICE/OUTPT VISIT, EST, LEVL III, 20-29 MIN: ICD-10-PCS | Mod: S$GLB,,, | Performed by: OBSTETRICS & GYNECOLOGY

## 2021-11-04 PROCEDURE — 3074F PR MOST RECENT SYSTOLIC BLOOD PRESSURE < 130 MM HG: ICD-10-PCS | Mod: CPTII,S$GLB,, | Performed by: OBSTETRICS & GYNECOLOGY

## 2021-12-01 ENCOUNTER — PATIENT MESSAGE (OUTPATIENT)
Dept: OBSTETRICS AND GYNECOLOGY | Facility: CLINIC | Age: 36
End: 2021-12-01
Payer: COMMERCIAL

## 2021-12-08 ENCOUNTER — OFFICE VISIT (OUTPATIENT)
Dept: OBSTETRICS AND GYNECOLOGY | Facility: CLINIC | Age: 36
End: 2021-12-08
Payer: COMMERCIAL

## 2021-12-08 VITALS — HEIGHT: 62 IN | BODY MASS INDEX: 45.68 KG/M2 | WEIGHT: 248.25 LBS

## 2021-12-08 DIAGNOSIS — Z01.812 PRE-PROCEDURE LAB EXAM: ICD-10-CM

## 2021-12-08 DIAGNOSIS — Z30.46 ENCOUNTER FOR REMOVAL AND REINSERTION OF NEXPLANON: Primary | ICD-10-CM

## 2021-12-08 LAB
B-HCG UR QL: NEGATIVE
CTP QC/QA: YES

## 2021-12-08 PROCEDURE — 99999 PR PBB SHADOW E&M-EST. PATIENT-LVL II: CPT | Mod: PBBFAC,,, | Performed by: OBSTETRICS & GYNECOLOGY

## 2021-12-08 PROCEDURE — 99499 NO LOS: ICD-10-PCS | Mod: S$GLB,,, | Performed by: OBSTETRICS & GYNECOLOGY

## 2021-12-08 PROCEDURE — 11981 INSERTION OF NEXPLANON: ICD-10-PCS | Mod: S$GLB,,, | Performed by: OBSTETRICS & GYNECOLOGY

## 2021-12-08 PROCEDURE — 99499 UNLISTED E&M SERVICE: CPT | Mod: S$GLB,,, | Performed by: OBSTETRICS & GYNECOLOGY

## 2021-12-08 PROCEDURE — 81025 URINE PREGNANCY TEST: CPT | Mod: S$GLB,,, | Performed by: OBSTETRICS & GYNECOLOGY

## 2021-12-08 PROCEDURE — 99999 PR PBB SHADOW E&M-EST. PATIENT-LVL II: ICD-10-PCS | Mod: PBBFAC,,, | Performed by: OBSTETRICS & GYNECOLOGY

## 2021-12-08 PROCEDURE — 11981 INSERTION DRUG DLVR IMPLANT: CPT | Mod: S$GLB,,, | Performed by: OBSTETRICS & GYNECOLOGY

## 2021-12-08 PROCEDURE — 81025 POCT URINE PREGNANCY: ICD-10-PCS | Mod: S$GLB,,, | Performed by: OBSTETRICS & GYNECOLOGY

## 2022-01-10 ENCOUNTER — PATIENT OUTREACH (OUTPATIENT)
Dept: ADMINISTRATIVE | Facility: OTHER | Age: 37
End: 2022-01-10
Payer: COMMERCIAL

## 2022-01-10 ENCOUNTER — OFFICE VISIT (OUTPATIENT)
Dept: PHYSICAL MEDICINE AND REHAB | Facility: CLINIC | Age: 37
End: 2022-01-10
Payer: COMMERCIAL

## 2022-01-10 VITALS — HEIGHT: 62 IN | WEIGHT: 248 LBS | BODY MASS INDEX: 45.64 KG/M2 | RESPIRATION RATE: 18 BRPM

## 2022-01-10 DIAGNOSIS — M25.522 LEFT ELBOW PAIN: ICD-10-CM

## 2022-01-10 DIAGNOSIS — M77.02 MEDIAL EPICONDYLITIS OF ELBOW, LEFT: Primary | ICD-10-CM

## 2022-01-10 PROCEDURE — 3008F BODY MASS INDEX DOCD: CPT | Mod: CPTII,S$GLB,, | Performed by: PHYSICAL MEDICINE & REHABILITATION

## 2022-01-10 PROCEDURE — 99213 OFFICE O/P EST LOW 20 MIN: CPT | Mod: 25,S$GLB,, | Performed by: PHYSICAL MEDICINE & REHABILITATION

## 2022-01-10 PROCEDURE — 20551 NJX 1 TENDON ORIGIN/INSJ: CPT | Mod: S$GLB,,, | Performed by: PHYSICAL MEDICINE & REHABILITATION

## 2022-01-10 PROCEDURE — 99213 PR OFFICE/OUTPT VISIT, EST, LEVL III, 20-29 MIN: ICD-10-PCS | Mod: 25,S$GLB,, | Performed by: PHYSICAL MEDICINE & REHABILITATION

## 2022-01-10 PROCEDURE — 1159F MED LIST DOCD IN RCRD: CPT | Mod: CPTII,S$GLB,, | Performed by: PHYSICAL MEDICINE & REHABILITATION

## 2022-01-10 PROCEDURE — 20551 TENDON ORIGIN: L ELBOW: ICD-10-PCS | Mod: S$GLB,,, | Performed by: PHYSICAL MEDICINE & REHABILITATION

## 2022-01-10 PROCEDURE — 76942 TENDON ORIGIN: L ELBOW: ICD-10-PCS | Mod: S$GLB,,, | Performed by: PHYSICAL MEDICINE & REHABILITATION

## 2022-01-10 PROCEDURE — 99999 PR PBB SHADOW E&M-EST. PATIENT-LVL III: ICD-10-PCS | Mod: PBBFAC,,, | Performed by: PHYSICAL MEDICINE & REHABILITATION

## 2022-01-10 PROCEDURE — 3008F PR BODY MASS INDEX (BMI) DOCUMENTED: ICD-10-PCS | Mod: CPTII,S$GLB,, | Performed by: PHYSICAL MEDICINE & REHABILITATION

## 2022-01-10 PROCEDURE — 1159F PR MEDICATION LIST DOCUMENTED IN MEDICAL RECORD: ICD-10-PCS | Mod: CPTII,S$GLB,, | Performed by: PHYSICAL MEDICINE & REHABILITATION

## 2022-01-10 PROCEDURE — 76942 ECHO GUIDE FOR BIOPSY: CPT | Mod: S$GLB,,, | Performed by: PHYSICAL MEDICINE & REHABILITATION

## 2022-01-10 PROCEDURE — 99999 PR PBB SHADOW E&M-EST. PATIENT-LVL III: CPT | Mod: PBBFAC,,, | Performed by: PHYSICAL MEDICINE & REHABILITATION

## 2022-01-10 RX ADMIN — TRIAMCINOLONE ACETONIDE 20 MG: 40 INJECTION, SUSPENSION INTRA-ARTICULAR; INTRAMUSCULAR at 01:01

## 2022-01-10 NOTE — PROGRESS NOTES
Chart was reviewed for overdue [facility]active [facility] Encounters ([facility])  topics  Updates were requested from care everywhere  Health Maintenance has been updated  [place] immunization registry triggered  Immunizations were reconciled                0

## 2022-01-11 RX ORDER — TRIAMCINOLONE ACETONIDE 40 MG/ML
20 INJECTION, SUSPENSION INTRA-ARTICULAR; INTRAMUSCULAR
Status: DISCONTINUED | OUTPATIENT
Start: 2022-01-10 | End: 2022-01-11 | Stop reason: HOSPADM

## 2022-01-11 NOTE — PROGRESS NOTES
Chief Complaint   Patient presents with    Arm Pain         HPI: Sotero Pelaez is a  36 y.o. female who presents today for followup. she was last seen in clinic over a year ago which time a recommended ultrasound-guided percutaneous tenotomy to the left common flexor tendon at the elbow.  She states that her elbow had been doing fairly well up until recently.  She states that her pain has returned and is otherwise unchanged in location, duration, intensity, or characteristics.        Review of Systems   Constitutional: Negative for chills and fever.   HENT: Negative for congestion and tinnitus.    Eyes: Negative for blurred vision and photophobia.   Respiratory: Negative for shortness of breath and wheezing.    Cardiovascular: Negative for chest pain and palpitations.   Gastrointestinal: Negative for nausea and vomiting.   Genitourinary: Negative for dysuria and frequency.   Musculoskeletal: Positive for joint pain. Negative for myalgias.   Skin: Negative for itching and rash.   Neurological: Negative for speech change and focal weakness.   Endo/Heme/Allergies: Negative for environmental allergies. Does not bruise/bleed easily.   Psychiatric/Behavioral: Negative for depression. The patient is not nervous/anxious.             Past Medical History:   Diagnosis Date    Abnormal Pap smear     No further treatment per patient.  Recheck normal per patient.    Anemia     Asthma     since younger    Herpes simplex without mention of complication     Obese           Current Outpatient Medications on File Prior to Visit   Medication Sig Dispense Refill    etonogestreL (NEXPLANON) 68 mg Impl subdermal device by Subdermal route.       Current Facility-Administered Medications on File Prior to Visit   Medication Dose Route Frequency Provider Last Rate Last Admin    etonogestreL subdermal device 68 mg  68 mg Implant  Michelle Meyer MD   68 mg at 12/08/21 0920              Physical Exam:  Vitals:    01/10/22 1322    Resp: 18     Physical Exam  Constitutional:       General: She is not in acute distress.     Appearance: Normal appearance.   HENT:      Head: Normocephalic and atraumatic.      Nose: Nose normal. No congestion.      Mouth/Throat:      Mouth: Mucous membranes are moist.      Pharynx: Oropharynx is clear.   Eyes:      Extraocular Movements: Extraocular movements intact.      Pupils: Pupils are equal, round, and reactive to light.   Neck:      Trachea: Trachea and phonation normal.   Pulmonary:      Effort: Pulmonary effort is normal. No respiratory distress.   Abdominal:      General: Abdomen is flat. There is no distension.   Skin:     General: Skin is warm and dry.   Neurological:      Mental Status: She is alert and oriented to person, place, and time. Mental status is at baseline.      Gait: Gait is intact.   Psychiatric:         Mood and Affect: Mood and affect normal.         Behavior: Behavior normal.       Left Elbow Exam     Tenderness   The patient is experiencing tenderness in the medial epicondyle.           MRI of the left elbow without contrast     HISTORY: Left-sided elbow pain.     Multiplanar noncontrast imaging is performed.     There is edema within the soft tissues along the medial aspect of the  elbow. This includes the proximal portion of the medial collateral  ligament and proximal fibers of the pronator teres muscle.  Corresponding plain radiographs demonstrate small foci of  mineralization within the soft tissues at this level with the findings  compatible with calcific tendinopathy/ hydroxyapatite deposition  disease. The common flexor and extensor tendons and lateral  ligamentous structures are intact. The remainder of the major elbow  tendons appear unremarkable.     There are mild changes of intramuscular edema involving the distal  fibers of the brachialis and triceps muscles. Subcutaneous soft tissue  edema is observed along the posterior aspect of the elbow. There is a  small elbow  joint effusion.     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     Mild intramuscular edema within the distal fibers of the brachialis  and triceps muscles. This finding could relate to low-grade muscle  strain or other etiologies of myositis.     Small joint effusion.     Posterior subcutaneous edema.     Electronically Signed by Cesilia Mackey       Assessment:  Medial epicondylitis of elbow, left    Left elbow pain               PLAN:  Sotero Pelaez is a 36 y.o. female with left medial elbow pain secondary to left medial epicondylitis.  She had an injection approximately 1 year ago which provided her near 1 year of improvement in pain and function.  At this time, I recommend proceeding with a left medial epicondyle injection with ultrasound guidance.  Should this provide her with long-lasting meaningful relief, these can be did as needed.  If does not, we could potentially proceed with ultrasound-guided percutaneous tenotomy using Tenex to the left common flexor tendon at the elbow.                      Wali Nath D.O.  Physical Medicine and Rehabilitation

## 2022-01-11 NOTE — PROCEDURES
Tendon Origin: L elbow    Date/Time: 1/10/2022 1:20 PM  Performed by: Wali Nath MD  Authorized by: Wali Nath MD     Consent Done?:  Yes (Written)  Timeout: prior to procedure the correct patient, procedure, and site was verified    Indications:  Diagnostic evaluation and pain  Timeout: prior to procedure the correct patient, procedure, and site was verified    Location:  Elbow  Site:  L elbow  Prep: patient was prepped and draped in usual sterile fashion    Ultrasonic Guidance for Needle Placement?: Yes    Needle size:  25 G  Medications:  20 mg triamcinolone acetonide 40 mg/mL    Procedure:  Left common flexor tendon at the medial epicondyle injection with ultrasound guidance    Procedure indications:  Left medial epicondylitis    Procedure in detail:  Risks and benefits were discussed and written informed consent was obtained.  The patient was placed in seated position next to the [facility] 60 ultrasound.  Using a linear transducer, the left common flexor tendon of the elbow was evaluated.  Distally, skin was cleaned with ChloraPrep and allowed to dry.  A 25 gauge 1.5 in needle was used to anesthetize the skin and tract with approximately 1 cc of 1% lidocaine and advanced under direct ultrasound visualization just outside the common flexor tendon where a 1.5 cc solution of 0.5 cc of 40 milligrams/milliliter of Kenalog and 1 cc of 0.50% ropivacaine was injected.  Needle was removed and Band-Aid was applied.

## 2022-05-05 ENCOUNTER — HOSPITAL ENCOUNTER (EMERGENCY)
Facility: HOSPITAL | Age: 37
Discharge: HOME | End: 2022-05-05
Attending: EMERGENCY MEDICINE
Payer: COMMERCIAL

## 2022-05-05 ENCOUNTER — TELEPHONE (OUTPATIENT)
Dept: PHYSICAL MEDICINE AND REHAB | Facility: CLINIC | Age: 37
End: 2022-05-05
Payer: COMMERCIAL

## 2022-05-05 VITALS
HEART RATE: 72 BPM | DIASTOLIC BLOOD PRESSURE: 74 MMHG | RESPIRATION RATE: 16 BRPM | BODY MASS INDEX: 40.75 KG/M2 | HEIGHT: 63 IN | WEIGHT: 230 LBS | TEMPERATURE: 98 F | OXYGEN SATURATION: 97 % | SYSTOLIC BLOOD PRESSURE: 124 MMHG

## 2022-05-05 DIAGNOSIS — M77.8 SHOULDER TENDONITIS, RIGHT: Primary | ICD-10-CM

## 2022-05-05 DIAGNOSIS — R52 PAIN: ICD-10-CM

## 2022-05-05 LAB — B-HCG UR QL: NEGATIVE

## 2022-05-05 PROCEDURE — 81025 URINE PREGNANCY TEST: CPT | Performed by: NURSE PRACTITIONER

## 2022-05-05 PROCEDURE — 87389 HIV-1 AG W/HIV-1&-2 AB AG IA: CPT | Performed by: EMERGENCY MEDICINE

## 2022-05-05 PROCEDURE — 99284 EMERGENCY DEPT VISIT MOD MDM: CPT | Mod: 25

## 2022-05-05 PROCEDURE — 36415 COLL VENOUS BLD VENIPUNCTURE: CPT | Performed by: EMERGENCY MEDICINE

## 2022-05-05 PROCEDURE — 86803 HEPATITIS C AB TEST: CPT | Performed by: EMERGENCY MEDICINE

## 2022-05-05 RX ORDER — NAPROXEN 500 MG/1
500 TABLET ORAL 2 TIMES DAILY WITH MEALS
Qty: 60 TABLET | Refills: 0 | Status: SHIPPED | OUTPATIENT
Start: 2022-05-05 | End: 2023-04-24

## 2022-05-05 NOTE — ED PROVIDER NOTES
Encounter Date: 5/5/2022    SCRIBE #1 NOTE: I, Rosa Ortega, am scribing for, and in the presence of, JERMAINE Knapp.       History     Chief Complaint   Patient presents with    Arm Pain     C/o of right shoulder pain extending down R arm x 4 days with intermittent tinging--started after moving boxes     Time seen by provider: 9:09 AM on 05/05/2022    Sotero Pelaez is a 36 y.o. female who presents to the ED with an onset of right shoulder pain x 3 days. Patient was performing heavy lifting while moving furniture 6 days ago and additionally believes she slept wrong 4 days ago. She has since been experiencing right shoulder pain that occasionally radiates into upper arm with intermittent episodes of RUE numbness. Pain worsens with movement. She has been applying hot and cold compresses to shoulder and has been taking Advil which have provided little to no relief of symptoms. The patient denies neck pain or any other symptoms at this time. PMHx of asthma. No pertinent PSHx.    The history is provided by the patient.     Review of patient's allergies indicates:  No Known Allergies  Past Medical History:   Diagnosis Date    Abnormal Pap smear     No further treatment per patient.  Recheck normal per patient.    Anemia     Asthma     since younger    Herpes simplex without mention of complication     Obese      Past Surgical History:   Procedure Laterality Date    EYE SURGERY       Family History   Problem Relation Age of Onset    Breast cancer Maternal Aunt     Cancer Maternal Aunt     Diabetes Maternal Aunt     Hypertension Mother     Diabetes Mother     Stroke Sister     Colon cancer Neg Hx     Ovarian cancer Neg Hx      Social History     Tobacco Use    Smoking status: Never Smoker    Smokeless tobacco: Never Used   Substance Use Topics    Alcohol use: Yes     Comment: Seldomly    Drug use: Yes     Types: Marijuana     Comment: seldom     Review of Systems   Constitutional: Negative for  fever.   HENT: Negative for sore throat.    Respiratory: Negative for shortness of breath.    Cardiovascular: Negative for chest pain.   Gastrointestinal: Negative for nausea.   Genitourinary: Negative for dysuria.   Musculoskeletal: Positive for arthralgias (right shoulder) and myalgias (RUE). Negative for back pain and neck pain.   Skin: Negative for rash.   Neurological: Positive for numbness (RUE). Negative for weakness.   Hematological: Does not bruise/bleed easily.       Physical Exam     Initial Vitals [05/05/22 0802]   BP Pulse Resp Temp SpO2   (!) 141/86 77 16 98.5 °F (36.9 °C) 100 %      MAP       --         Physical Exam    Nursing note and vitals reviewed.  Constitutional: Vital signs are normal. She appears well-developed and well-nourished.   HENT:   Head: Normocephalic and atraumatic.   Eyes: Pupils are equal, round, and reactive to light.   Neck: Neck supple.    Full passive range of motion without pain.     Cardiovascular: Normal rate, regular rhythm, normal heart sounds and intact distal pulses. Exam reveals no gallop and no friction rub.    No murmur heard.  Pulses:       Radial pulses are 2+ on the right side and 2+ on the left side.   Pulmonary/Chest: Breath sounds normal. She has no wheezes. She has no rhonchi. She has no rales.   Abdominal: Abdomen is soft. Bowel sounds are normal. There is no abdominal tenderness.   Musculoskeletal:      Right shoulder: Tenderness present. No swelling, deformity, effusion, laceration, bony tenderness or crepitus. Decreased range of motion. Normal strength. Normal pulse.      Right upper arm: Normal.      Cervical back: Full passive range of motion without pain and neck supple.      Comments: Reproducible anterior right shoulder pain. Decreased ROM. 5/5 strength and normal sensation to bilateral upper extremities. No midline C spine tenderness.     Neurological: She is alert and oriented to person, place, and time. She has normal strength.   Skin: Skin is  warm, dry and intact.   Psychiatric: She has a normal mood and affect. Her speech is normal and behavior is normal.         ED Course   Procedures  Labs Reviewed   PREGNANCY TEST, URINE RAPID    Narrative:     Specimen Source->Urine   HIV 1 / 2 ANTIBODY   HEPATITIS C ANTIBODY          Imaging Results          X-Ray Shoulder Trauma Right (Final result)  Result time 05/05/22 10:02:18    Final result by Jona Trejo Jr., MD (05/05/22 10:02:18)                 Impression:      A single soft tissue calcification above the greater tuberosity may represent calcific tendinitis otherwise negative right shoulder x-rays      Electronically signed by: Jona Trejo MD  Date:    05/05/2022  Time:    10:02             Narrative:    EXAMINATION:  XR SHOULDER TRAUMA 3 VIEW RIGHT    CLINICAL HISTORY:  Pain, unspecified    TECHNIQUE:  2 or more views of the shoulder are obtained.    COMPARISON:  None.    FINDINGS:  No dislocation is seen.  A fracture of the scapula, humerus or clavicle is not seen.  The acromioclavicular and glenohumeral joints are within normal limits.  A single very small calcification is seen above the greater tuberosity which may represent calcific tendinitis.                                 Medications - No data to display  Medical Decision Making:   History:   Old Medical Records: I decided to obtain old medical records.  Differential Diagnosis:   Fracture  Tendonitis  Bursitis   Clinical Tests:   Lab Tests: Ordered and Reviewed  Radiological Study: Reviewed and Ordered       APC / Resident Notes:   Patient is a 36 y.o. female who presents to the ED 05/05/2022 who underwent emergent evaluation   For right shoulder pain after  Heavy lifting.  Patient denies any other injury or trauma.  The pain is reproducible to touch.  She does have decreased range of motion the shoulder due to pain.  No deformity, swelling, or erythema to the shoulder.  X-ray of right shoulder is consistent with A single soft tissue  calcification above the greater tuberosity may represent calcific tendinitis otherwise negative right shoulder x-rays.  Patient is made aware of these findings and given anti-inflammatory as well as sling and referred to Orthopedics.  Patient agreeable this plan of care. Based on my clinical evaluation, I do not appreciate any immediate, emergent, or life threatening condition or etiology that warrants additional workup today and feel that the patient can be discharged with close follow up care. Follow up and return precautions discussed; patient verbalized understanding and is agreeable to plan of care. Patient discharged home in stable condition.              Scribe Attestation:   Scribe #1: I performed the above scribed service and the documentation accurately describes the services I performed. I attest to the accuracy of the note.    Attending Attestation:           Physician Attestation for Scribe:  Physician Attestation Statement for Scribe #1: I, Ashlie Garcia, reviewed documentation, as scribed by in my presence, and it is both accurate and complete.     Comments: I, JERMAINE Knapp, personally performed the services described in this documentation. All medical record entries made by the scribe were at my direction and in my presence.  I have reviewed the chart and agree that the record reflects my personal performance and is accurate and complete. JERMAINE Knapp.  6:40 PM 05/05/2022                   Clinical Impression:   Final diagnoses:  [R52] Pain  [M77.8] Shoulder tendonitis, right (Primary)          ED Disposition Condition    Discharge Stable        ED Prescriptions     Medication Sig Dispense Start Date End Date Auth. Provider    naproxen (NAPROSYN) 500 MG tablet Take 1 tablet (500 mg total) by mouth 2 (two) times daily with meals. 60 tablet 5/5/2022  Ashlie Garcia NP        Follow-up Information     Follow up With Specialties Details Why Contact Info    Abran Montana MD Sports  Medicine, Orthopedic Surgery In 3 days  [address] [place] [address] [address]  [facility] [address]  [phone]      [facility] Emergency Dept Emergency Medicine  As needed, If symptoms worsen [address] [place] [address] Drive  [facility] [id number] [phone]           Ashlie Garcia NP  05/05/22 6530

## 2022-05-05 NOTE — TELEPHONE ENCOUNTER
Pt was in ER for R shoulder pain. She states Dr. Nath treated her L shoulder, so the ER told her to follow up with him. Scheduled on 5/10 at 8am.

## 2022-05-05 NOTE — ED NOTES
Right shoulder pain radiating to mid forearm that started on Monday after moving some heavy things around in apartment. Constant stabbing pain, rates 10/10. Pain increases with ROM. Tried hot/cold compresses, Advil at home, not effective.

## 2022-05-05 NOTE — TELEPHONE ENCOUNTER
----- Message from Carie Freire sent at 5/5/2022 12:05 PM CDT -----  Type: Needs Medical Advice         Who Called: pt  Best Call Back Number:[phone]  Additional Information: Requesting a call back regarding  pt is needing a Hospital F.U appt.  Pt was discharged today. Pt was admitted due to right shoulder tendinopathy.    Please Advise- Thank you

## 2022-05-06 LAB
HCV AB SERPL QL IA: NEGATIVE
HIV 1+2 AB+HIV1 P24 AG SERPL QL IA: NEGATIVE

## 2022-05-10 ENCOUNTER — OFFICE VISIT (OUTPATIENT)
Dept: PHYSICAL MEDICINE AND REHAB | Facility: CLINIC | Age: 37
End: 2022-05-10
Payer: COMMERCIAL

## 2022-05-10 VITALS — HEIGHT: 63 IN | RESPIRATION RATE: 20 BRPM | BODY MASS INDEX: 40.75 KG/M2 | WEIGHT: 230 LBS

## 2022-05-10 DIAGNOSIS — M75.91 SUPRASPINATUS TENDINITIS, RIGHT: Primary | ICD-10-CM

## 2022-05-10 DIAGNOSIS — M25.511 ACUTE PAIN OF RIGHT SHOULDER: ICD-10-CM

## 2022-05-10 DIAGNOSIS — M77.8 SHOULDER TENDONITIS, RIGHT: ICD-10-CM

## 2022-05-10 PROCEDURE — 99999 PR PBB SHADOW E&M-EST. PATIENT-LVL III: CPT | Mod: PBBFAC,,, | Performed by: PHYSICAL MEDICINE & REHABILITATION

## 2022-05-10 PROCEDURE — 99213 OFFICE O/P EST LOW 20 MIN: CPT | Mod: S$GLB,,, | Performed by: PHYSICAL MEDICINE & REHABILITATION

## 2022-05-10 PROCEDURE — 3008F BODY MASS INDEX DOCD: CPT | Mod: CPTII,S$GLB,, | Performed by: PHYSICAL MEDICINE & REHABILITATION

## 2022-05-10 PROCEDURE — 3008F PR BODY MASS INDEX (BMI) DOCUMENTED: ICD-10-PCS | Mod: CPTII,S$GLB,, | Performed by: PHYSICAL MEDICINE & REHABILITATION

## 2022-05-10 PROCEDURE — 1160F PR REVIEW ALL MEDS BY PRESCRIBER/CLIN PHARMACIST DOCUMENTED: ICD-10-PCS | Mod: CPTII,S$GLB,, | Performed by: PHYSICAL MEDICINE & REHABILITATION

## 2022-05-10 PROCEDURE — 1159F PR MEDICATION LIST DOCUMENTED IN MEDICAL RECORD: ICD-10-PCS | Mod: CPTII,S$GLB,, | Performed by: PHYSICAL MEDICINE & REHABILITATION

## 2022-05-10 PROCEDURE — 1160F RVW MEDS BY RX/DR IN RCRD: CPT | Mod: CPTII,S$GLB,, | Performed by: PHYSICAL MEDICINE & REHABILITATION

## 2022-05-10 PROCEDURE — 99213 PR OFFICE/OUTPT VISIT, EST, LEVL III, 20-29 MIN: ICD-10-PCS | Mod: S$GLB,,, | Performed by: PHYSICAL MEDICINE & REHABILITATION

## 2022-05-10 PROCEDURE — 1159F MED LIST DOCD IN RCRD: CPT | Mod: CPTII,S$GLB,, | Performed by: PHYSICAL MEDICINE & REHABILITATION

## 2022-05-10 PROCEDURE — 99999 PR PBB SHADOW E&M-EST. PATIENT-LVL III: ICD-10-PCS | Mod: PBBFAC,,, | Performed by: PHYSICAL MEDICINE & REHABILITATION

## 2022-05-10 NOTE — PROGRESS NOTES
Chief Complaint   Patient presents with    Shoulder Pain     Right shoulder pain         HPI: Sotero Pelaez is a  36 y.o. female who presents today for evaluation and treatment of right shoulder pain.  She states that her pain started approximately 1 week ago.  Pain is over the anterolateral shoulder and radiates down the arm stopping at about the elbow.  She describes the pain as a sharp shooting pain she was seen in the emergency room prescribed NSAIDs.  She takes naproxen b.i.d. p.r.n. with significant improvement in pain and function.  On today's visit she complains of no pain.        Review of Systems   Constitutional: Negative for chills and fever.   HENT: Negative for congestion and tinnitus.    Eyes: Negative for blurred vision and photophobia.   Respiratory: Negative for shortness of breath and wheezing.    Cardiovascular: Negative for chest pain and palpitations.   Gastrointestinal: Negative for nausea and vomiting.   Genitourinary: Negative for dysuria and frequency.   Musculoskeletal: Positive for joint pain. Negative for myalgias.   Skin: Negative for itching and rash.   Neurological: Negative for speech change and focal weakness.   Endo/Heme/Allergies: Negative for environmental allergies. Does not bruise/bleed easily.   Psychiatric/Behavioral: Negative for depression. The patient is not nervous/anxious.             Past Medical History:   Diagnosis Date    Abnormal Pap smear     No further treatment per patient.  Recheck normal per patient.    Anemia     Asthma     since younger    Herpes simplex without mention of complication     Obese           Current Outpatient Medications on File Prior to Visit   Medication Sig Dispense Refill    etonogestreL (NEXPLANON) 68 mg Impl subdermal device by Subdermal route.      naproxen (NAPROSYN) 500 MG tablet Take 1 tablet (500 mg total) by mouth 2 (two) times daily with meals. 60 tablet 0     Current Facility-Administered Medications on File Prior to  Visit   Medication Dose Route Frequency Provider Last Rate Last Admin    etonogestreL subdermal device 68 mg  68 mg Implant  Michelle Meyer MD   68 mg at 12/08/21 0920              Physical Exam:  Vitals:    05/10/22 0800   Resp: 20     Physical Exam  Constitutional:       General: She is not in acute distress.     Appearance: Normal appearance.   HENT:      Head: Normocephalic and atraumatic.      Nose: Nose normal. No congestion.      Mouth/Throat:      Mouth: Mucous membranes are moist.      Pharynx: Oropharynx is clear.   Eyes:      Extraocular Movements: Extraocular movements intact.      Pupils: Pupils are equal, round, and reactive to light.   Neck:      Trachea: Trachea and phonation normal.   Pulmonary:      Effort: Pulmonary effort is normal. No respiratory distress.   Abdominal:      General: Abdomen is flat. There is no distension.   Skin:     General: Skin is warm and dry.   Neurological:      General: No focal deficit present.      Mental Status: She is alert and oriented to person, place, and time.      Cranial Nerves: Cranial nerves are intact.      Sensory: Sensation is intact.      Motor: Motor function is intact.      Gait: Gait is intact.   Psychiatric:         Mood and Affect: Mood and affect normal.         Behavior: Behavior normal.       Right Shoulder Exam     Tenderness   The patient is experiencing tenderness in the acromion.    Muscle Strength   Abduction: 5/5   Internal rotation: 5/5   External rotation: 5/5   Biceps: 5/5     Tests   Jose test: negative  Impingement: negative                  Assessment:  Supraspinatus tendinitis, right  -     Ambulatory referral/consult to Physical/Occupational Therapy; Future; Expected date: 05/17/2022    Shoulder tendonitis, right  -     Ambulatory referral/consult to Orthopedics  -     Ambulatory referral/consult to Physical/Occupational Therapy; Future; Expected date: 05/17/2022    Acute pain of right shoulder               PLAN:  Sotero  Queta Pelaez is a 36 y.o. female with acute right shoulder pain.  History physical examination and diagnostic ultrasound is likely consistent with a small interstitial tear of the supraspinatus tendon near the insertion at the greater tuberosity.  There appears to be slight hyperechoic region which could indicate an early calcification.  She has found excellent relief with naproxen.  At this time, I recommend physical therapy and NSAIDs as needed.  She will follow up in clinic as needed.                    Wali Nath D.O.  Physical Medicine and Rehabilitation

## 2022-05-23 ENCOUNTER — CLINICAL SUPPORT (OUTPATIENT)
Dept: REHABILITATION | Facility: HOSPITAL | Age: 37
End: 2022-05-23
Attending: PHYSICAL MEDICINE & REHABILITATION
Payer: COMMERCIAL

## 2022-05-23 DIAGNOSIS — M75.91 SUPRASPINATUS TENDINITIS, RIGHT: Primary | ICD-10-CM

## 2022-05-23 DIAGNOSIS — R53.1 WEAKNESS: ICD-10-CM

## 2022-05-23 DIAGNOSIS — Z55.9 SPECIAL EDUCATIONAL NEEDS: ICD-10-CM

## 2022-05-23 DIAGNOSIS — Z74.09 DECREASED FUNCTIONAL MOBILITY AND ENDURANCE: ICD-10-CM

## 2022-05-23 DIAGNOSIS — M25.511 ACUTE PAIN OF RIGHT SHOULDER: ICD-10-CM

## 2022-05-23 DIAGNOSIS — M77.8 SHOULDER TENDONITIS, RIGHT: ICD-10-CM

## 2022-05-23 PROCEDURE — 97162 PT EVAL MOD COMPLEX 30 MIN: CPT | Mod: PN

## 2022-05-23 PROCEDURE — 97110 THERAPEUTIC EXERCISES: CPT | Mod: PN

## 2022-05-23 SDOH — SOCIAL DETERMINANTS OF HEALTH (SDOH): PROBLEMS RELATED TO EDUCATION AND LITERACY, UNSPECIFIED: Z55.9

## 2022-05-23 NOTE — PLAN OF CARE
"[facility]   Physical Therapy Initial Evaluation     Date: 5/23/2022   Name: Sotero Pelaez  Clinic Number: [id number]    Therapy Diagnosis:   Encounter Diagnoses   Name Primary?    Shoulder tendonitis, right     Supraspinatus tendinitis, right Yes    Acute pain of right shoulder     Weakness     Decreased functional mobility and endurance     Special educational needs      Physician: Wali Nath MD    Physician Orders: PT Eval and Treat   Medical Diagnosis from Referral: M77.8 (ICD-10-CM) - Shoulder tendonitis, right M75.91 (ICD-10-CM) - Supraspinatus tendinitis, right   Evaluation Date: 5/23/2022  Authorization Period Expiration: 05/10/2023  Plan of Care Expiration: 07/22/2022 at 2x/wk x 8 weeks.  Progress Note Due: 06/23/2022  Visit # / Visits authorized: 1/ 1  FOTO: 1/ 3    Time In: 1:02 pm  Time Out: 1:47 pm  Total Appointment Time (timed & untimed codes): 45 minutes    Precautions: Standard  SUBJECTIVE   Date of onset: The patient reports approximately a three week history of her current complaints.    History of current condition - Sotero reports: that she was moving furniture one day at home, and four days later she couldn't move her right shoulder. She is unsure if it was the lifting of furniture or had she slept on it wrong since the furniture day. She went to the ER, and was then sent to the Dr. "She reports the Dr. Believes it is "some kind of tendinitis." She reports that it currently doesn't hurt as bad as it did initially. However, she reports that it still hurts at times. No mention of MRI from her Dr but she believes he wanted to try PT before ordering an MRI. The patient is right handed.    Falls: No history of falls.    Imaging,  FINDINGS: No dislocation is seen.  A fracture of the scapula, humerus or clavicle is not seen. The acromioclavicular and glenohumeral joints are within normal limits. A single very small calcification is seen above the greater " "tuberosity which may represent calcific tendinitis. Impression: A single soft tissue calcification above the greater tuberosity may represent calcific tendinitis otherwise negative right shoulder x-rays.     Prior Therapy: This is the first time to have PT for these complaints.  Social History: Patient lives with her preteen and teenage sons.   Occupation: Patient is a  for the [employer]. 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  Prior Level of Function: The patient reports no limitations.  Current Level of Function: She can perform as needed activities but with pain. She reports having to rest at times.    Pain:  Current 0/10, worst 4/10, best 0/10   Location: right shoulder and acromioclavicular joint and down the right upper extremity at times.  Description: Grabbing and Sharp  Aggravating Factors: overuse of the right arm, overhead reaching, and lifting objects.  Easing Factors: rest and being careful and over the counter medication.    Patients goals: "to be able to deal with my shoulder pain or get well."     Medical History:   Past Medical History:   Diagnosis Date    Abnormal Pap smear     No further treatment per patient.  Recheck normal per patient.    Anemia     Asthma     since younger    Herpes simplex without mention of complication     Obese      Surgical History:   Sotero Pelaez  has a past surgical history that includes Eye surgery.    Medications:   Sotero has a current medication list which includes the following prescription(s): etonogestrel and naproxen, and the following Facility-Administered Medications: etonogestrel.    Allergies:   Review of patient's allergies indicates:  No Known Allergies     OBJECTIVE       SHOULDER    Impaired Shoulder: Right    Hand Dominance: Right    Structural/Postural Inspection/Palpation: No structural abnormalities noted. No rounded shoulders. No forward head.      Palpation tenderness to: tenderness to the right anterior " glenohumeral joint and the right acromioclavicular joint.    Flexibility    Muscle Left Right Comment         Upper Trapezius Normal Normal    Pectoralis Minor Normal Minimal Increase              ROM No loss of active motion noted bilaterally       Shoulder AROM AROM Comment    Left Right    Flexion WNL * WNL *    Extension WNL * WNL *    ABduction  WNL* WNL *    Medial Rotation WNL *  WNL*    Lateral Rotation  WNL*  WNL*              MMT      Shoulder   Comment    Left Right    Flexion 5/5 5/5    Extension 5/5 5/5    ABduction 5/5 4+/5    ADduction 5/5 4+/5    Medial Rotation 5/5 5/5    Lateral Rotation 5/5 4+/5    Serratus Anteror 5/5 5/5     50 pounds 55 pounds           Shoulder Special Tests:       Impingement Tests Results Comments   Neer  Negative.    Jose  Negative.      Rotator Cuff Tears Results Comments   Drop Arm Tests Negative.    Empty Can  Negative.      Bicep Tests  Results Comments   Yergason (elbw fl-90* and pron, resist sup) Negative.      Instability  Results Comments   Anterior Drawer Negative.    Posterior Drawer (120* elbow flx, * shl abd, 20-30* shl fwd flx) Negative.    Sulcus  Negative.    Crank Apprehension (Abd + ER) Negative.        AC Joint  Results Comments   Cross Arm Adduction  Negative.        FUNCTIONAL MOBILITY      Balance: No gross abnormalities.      Gait: Normal gait pattern with normal arm swing.        Limitation/Restriction for FOTO Shoulder Survey    Therapist reviewed FOTO scores for Sotero Pelaez on 5/23/2022.   FOTO documents entered into [facility] - see Media section.    Limitation Score: 30% Limitation at the evaluation.         TREATMENT     Total Treatment time (time-based codes) separate from Evaluation: 15 minutes      Sotero received the treatments listed below:      -Right shoulder: flexion, extension, adduction, external rotation, and internal rotation with red band x 15 each  -right shoulder empty can with 1 pound dumb bell x 20  -horizontal  abduction with red band x 15  -verbal education on wall clocks (picture put on home program)    PATIENT EDUCATION AND HOME EXERCISES     Education provided:   -The patient received her initial written home exercise program today. She returned demo to PT and was without questions.    Written Home Exercises Provided: yes. Exercises were reviewed and Sotero was able to demonstrate them prior to the end of the session.  Sotero demonstrated good  understanding of the education provided. See EMR under Patient Instructions for exercises provided during therapy sessions.    ASSESSMENT     Sotero is a 36 y.o. female referred to outpatient Physical Therapy with a medical diagnosis of M77.8 (ICD-10-CM) - Shoulder tendonitis, right M75.91 (ICD-10-CM) - Supraspinatus tendinitis, right. Patient presents with right shoulder pain and decreased strength and decreased function and needing patient education and a written home exercise program.    Patient prognosis is Good.   Patientt will benefit from skilled outpatient Physical Therapy to address the deficits stated above and in the chart below, provide patient /family education, and to maximize patientt's level of independence.     Plan of care discussed with patient: Yes  Patient's spiritual, cultural and educational needs considered and patient is agreeable to the plan of care and goals as stated below:     Anticipated Barriers for therapy: none    Medical Necessity is demonstrated by the following  History  Co-morbidities and personal factors that may impact the plan of care Co-morbidities:   COPD/asthma, difficulty sleeping, level of undertstanding of current condition and abnormal pap smear, anemia, herpes simplex, and obesity.    Personal Factors:   coping style  lifestyle  character     moderate   Examination  Body Structures and Functions, activity limitations and participation restrictions that may impact the plan of care Body Regions:   upper extremities    Body Systems:     strength  gross coordinated movement  motor control  motor learning  and pain control.    Participation Restrictions:   No restrictions noted.    Activity limitations:   Learning and applying knowledge  no deficits    General Tasks and Commands  no deficits    Communication  no deficits    Mobility  lifting and carrying objects  fine hand use (grasping/picking up)    Self care  looking after one's health    Domestic Life  shopping  cooking  doing house work (cleaning house, washing dishes, laundry)  assisting others    Interactions/Relationships  no deficits    Life Areas  employment    Community and Social Life  community life  recreation and leisure         moderate   Clinical Presentation stable and uncomplicated moderate   Decision Making/ Complexity Score: moderate       Goals:    STG  Weeks/Visits Date Established  Date Met   1.Decrease max right shoulder pain to 2/10 to improve the patient's quality of life. 4 weeks. 5/23/2022      2. Increase right shoulder: abduction, adduction, and external rotation strength to 5-/5 to improve lifting ability. 4 weeks. 5/23/2022      3.Decrease FOTO limitation score to <=25% to improve the patient's job and household task ability. 4 weeks. 5/23/2022      4.Fair initial written home exercise program knowledge and be without questions in order to have carryover after discharge from PT. 4 weeks. 5/23/2022        LTG Weeks/Visits Date Established  Date Met   1.Decrease max right shoulder pain to 1/10 to improve the patient's quality of life. 8 weeks. 5/23/2022      2. Increase right shoulder: abduction, adduction, and external rotation strength to 5/5 to improve lifting ability. 8 weeks. 5/23/2022        3.Decrease FOTO limitation score to <=20% to improve the patient's job and household task ability. 8 weeks. 5/23/2022      4.Good progressed written home exercise program knowledge and be without questions in order to have carryover after discharge from PT. 8 weeks.  5/23/2022        PLAN   Plan of care Certification: 5/23/2022 to 07/22/2022.    Outpatient Physical Therapy 2 times weekly for 8 weeks to include the following interventions: Electrical Stimulation unattended., Manual Therapy, Moist Heat/ Ice, Neuromuscular Re-ed, Orthotic Management and Training, Patient Education, Self Care, Therapeutic Activities, Therapeutic Exercise, Ultrasound and care by a PTA..     Jose David Justice, SHERRELL      I CERTIFY THE NEED FOR THESE SERVICES FURNISHED UNDER THIS PLAN OF TREATMENT AND WHILE UNDER MY CARE   Physician's comments:     Physician's Signature: ___________________________________________________

## 2022-05-26 ENCOUNTER — CLINICAL SUPPORT (OUTPATIENT)
Dept: REHABILITATION | Facility: HOSPITAL | Age: 37
End: 2022-05-26
Attending: PHYSICAL MEDICINE & REHABILITATION
Payer: COMMERCIAL

## 2022-05-26 DIAGNOSIS — R53.1 WEAKNESS: ICD-10-CM

## 2022-05-26 DIAGNOSIS — Z55.9 SPECIAL EDUCATIONAL NEEDS: ICD-10-CM

## 2022-05-26 DIAGNOSIS — Z74.09 DECREASED FUNCTIONAL MOBILITY AND ENDURANCE: Primary | ICD-10-CM

## 2022-05-26 DIAGNOSIS — M25.511 ACUTE PAIN OF RIGHT SHOULDER: ICD-10-CM

## 2022-05-26 PROCEDURE — 97112 NEUROMUSCULAR REEDUCATION: CPT | Mod: PN

## 2022-05-26 PROCEDURE — 97110 THERAPEUTIC EXERCISES: CPT | Mod: PN

## 2022-05-26 SDOH — SOCIAL DETERMINANTS OF HEALTH (SDOH): PROBLEMS RELATED TO EDUCATION AND LITERACY, UNSPECIFIED: Z55.9

## 2022-05-26 NOTE — PROGRESS NOTES
"[facility]   Physical Therapy Treatment Note     Name: Sotero Pelaez  Clinic Number: [id number]    Therapy Diagnosis:   Encounter Diagnoses   Name Primary?    Acute pain of right shoulder     Weakness     Decreased functional mobility and endurance Yes    Special educational needs      Physician: Wali Nath MD    Visit Date: 5/26/2022    Physician Orders: PT Eval and Treat   Medical Diagnosis from Referral: M77.8 (ICD-10-CM) - Shoulder tendonitis, right M75.91 (ICD-10-CM) - Supraspinatus tendinitis, right   Evaluation Date: 5/23/2022  Authorization Period Expiration: 05/10/2023  Plan of Care Expiration: 07/22/2022 at 2x/wk x 8 weeks.  Progress Note Due: 06/23/2022  Visit # / Visits authorized: 1/ 20  Visits with the evaluation: 2  FOTO: 1/ 3    PTA Visit #: 0/5     Time In: 3:17 pm  Time Out: 4:10 pm  Total Appointment Time (timed & untimed codes): 53 minutes    Precautions: Standard  SUBJECTIVE     Pt reports: "I haven't had any problems since I was here last. No current shoulder pain. My home program seems to be going well." PT acknowledges.    She was compliant with home exercise program.  Response to previous treatment: This is the patient's first PT session since the evaluation.  Functional change: No changes to report at this time.    Pain: 0/10 upon entering the clinic today.  Location: right shoulder and acromioclavicular joint and down the right upper extremity at times.  OBJECTIVE     Objective Measures updated at progress report unless specified.     Treatment       Sotero received therapeutic exercises to develop strength, endurance, ROM, flexibility and posture for 30 minutes including:  -+upper body ergometer on Level 2 x 6 minutes forward and reverse for strengthening  -+wall clocks with red band x 10  -+swimming and reverse swimming with red band x 20 each direction  -+Unilateral latissimus pull down on the right with green tube x 20  -+land mines with 8 pound tbar " x 20  -+supine bench press with red band x 20  -+left side lying right arm external rotation with 3 pound dumb bell x 20  -+left side lying right arm flexion with 1 pound dumb bell x 20  -horizontal abduction with red band x 15    Below not performed today:  -Right shoulder: flexion, extension, adduction, external rotation, and internal rotation with red band x 15 each  -right shoulder empty can with 1 pound dumb bell x 20    Sotero participated in neuromuscular re-education activities to improve: Kinesthetic, Sense, Proprioception and Posture for 10 minutes. The following activities were included:  -Precor scapular retraction with 25 pounds x 20  -Precor DIPs with 30 pounds x 20  -Precor chest press with 10 pounds x 20  -Precor bicep curls with 10 pounds x 20    Sotero received cold pack for 10 minutes to the left shoulder post exercises.    Patient Education and Home Exercises     Home Exercises Provided and Patient Education Provided     Education provided:   -The patient has her initial written home exercise program. She is independent with it.  -+05/26/2022 Patient received an updated home program today. She returned demo to PT and was without questions.    Written Home Exercises Provided: yes. Exercises were reviewed and Sotero was able to demonstrate them prior to the end of the session.  Sotero demonstrated good  understanding of the education provided. See EMR under Patient Instructions for exercises provided during therapy sessions    ASSESSMENT     Today, the patient continued her left shoulder program. Per subjective, some resistances were increased and more complexed exercises were added. She did well, but realized she was not as strong as she thought once certain muscles were isolated. She was able to complete all repetitions with rest. She exhibited good motion. She required verbal cues at times to avoid compensation. She needs continued skilled PT to safely progress her strength and shoulder stability to  help provide her long term pain relief. She tolerated therapy well this day. Cold pack used after exercises.     Sotero Is progressing well towards her goals.   Pt prognosis is Good.     Pt will continue to benefit from skilled outpatient physical therapy to address the deficits listed in the problem list box on initial evaluation, provide pt/family education and to maximize pt's level of independence in the home and community environment.     Pt's spiritual, cultural and educational needs considered and pt agreeable to plan of care and goals.     Anticipated barriers to physical therapy: none    Goals:      STG  Weeks/Visits Date Established  Date Met   1.Decrease max right shoulder pain to 2/10 to improve the patient's quality of life. 4 weeks. 5/23/2022    In Progress 5/26/2022      2. Increase right shoulder: abduction, adduction, and external rotation strength to 5-/5 to improve lifting ability. 4 weeks. 5/23/2022    In Progress 5/26/2022      3.Decrease FOTO limitation score to <=25% to improve the patient's job and household task ability. 4 weeks. 5/23/2022    In Progress 5/26/2022      4.Fair initial written home exercise program knowledge and be without questions in order to have carryover after discharge from PT. 4 weeks. 5/23/2022     In Progress 5/26/2022        LTG Weeks/Visits Date Established  Date Met   1.Decrease max right shoulder pain to 1/10 to improve the patient's quality of life. 8 weeks. 5/23/2022     In Progress 5/26/2022     2. Increase right shoulder: abduction, adduction, and external rotation strength to 5/5 to improve lifting ability. 8 weeks. 5/23/2022        In Progress 5/26/2022     3.Decrease FOTO limitation score to <=20% to improve the patient's job and household task ability. 8 weeks. 5/23/2022    In Progress 5/26/2022      4.Good progressed written home exercise program knowledge and be without questions in order to have carryover after discharge from PT. 8 weeks. 5/23/2022    In  Progress 5/26/2022         PLAN     Plan of care Certification: 5/23/2022 to 07/22/2022. Outpatient Physical Therapy 2 times weekly for 8 weeks. Plan to maximize pain free left shoulder active motion and strength to improve stability and help provide long term pain relief.         Jose David Justice, PT

## 2022-06-01 ENCOUNTER — PATIENT MESSAGE (OUTPATIENT)
Dept: ADMINISTRATIVE | Facility: HOSPITAL | Age: 37
End: 2022-06-01
Payer: COMMERCIAL

## 2023-04-24 ENCOUNTER — OFFICE VISIT (OUTPATIENT)
Dept: OBSTETRICS AND GYNECOLOGY | Facility: CLINIC | Age: 38
End: 2023-04-24
Payer: COMMERCIAL

## 2023-04-24 VITALS
RESPIRATION RATE: 16 BRPM | BODY MASS INDEX: 45 KG/M2 | WEIGHT: 254 LBS | DIASTOLIC BLOOD PRESSURE: 74 MMHG | SYSTOLIC BLOOD PRESSURE: 118 MMHG | HEIGHT: 63 IN

## 2023-04-24 DIAGNOSIS — Z01.419 WELL WOMAN EXAM WITH ROUTINE GYNECOLOGICAL EXAM: Primary | ICD-10-CM

## 2023-04-24 DIAGNOSIS — Z20.89 CONTACT WITH AND (SUSPECTED) EXPOSURE TO OTHER COMMUNICABLE DISEASES: ICD-10-CM

## 2023-04-24 DIAGNOSIS — Z12.4 SCREENING FOR MALIGNANT NEOPLASM OF CERVIX: ICD-10-CM

## 2023-04-24 DIAGNOSIS — Z97.5 NEXPLANON IN PLACE: ICD-10-CM

## 2023-04-24 PROBLEM — R73.02 IGT (IMPAIRED GLUCOSE TOLERANCE): Status: RESOLVED | Noted: 2018-03-08 | Resolved: 2023-04-24

## 2023-04-24 PROBLEM — L02.429 BOIL, THIGH: Status: RESOLVED | Noted: 2018-05-08 | Resolved: 2023-04-24

## 2023-04-24 PROBLEM — M25.511 ACUTE PAIN OF RIGHT SHOULDER: Status: RESOLVED | Noted: 2022-05-23 | Resolved: 2023-04-24

## 2023-04-24 PROBLEM — N93.8 DUB (DYSFUNCTIONAL UTERINE BLEEDING): Status: RESOLVED | Noted: 2019-04-05 | Resolved: 2023-04-24

## 2023-04-24 PROBLEM — Z11.3 SCREENING FOR STD (SEXUALLY TRANSMITTED DISEASE): Status: RESOLVED | Noted: 2019-02-08 | Resolved: 2023-04-24

## 2023-04-24 PROCEDURE — 99395 PREV VISIT EST AGE 18-39: CPT | Mod: S$GLB,,, | Performed by: STUDENT IN AN ORGANIZED HEALTH CARE EDUCATION/TRAINING PROGRAM

## 2023-04-24 PROCEDURE — 81514 NFCT DS BV&VAGINITIS DNA ALG: CPT | Performed by: STUDENT IN AN ORGANIZED HEALTH CARE EDUCATION/TRAINING PROGRAM

## 2023-04-24 PROCEDURE — 87591 N.GONORRHOEAE DNA AMP PROB: CPT | Performed by: STUDENT IN AN ORGANIZED HEALTH CARE EDUCATION/TRAINING PROGRAM

## 2023-04-24 PROCEDURE — 3074F PR MOST RECENT SYSTOLIC BLOOD PRESSURE < 130 MM HG: ICD-10-PCS | Mod: CPTII,S$GLB,, | Performed by: STUDENT IN AN ORGANIZED HEALTH CARE EDUCATION/TRAINING PROGRAM

## 2023-04-24 PROCEDURE — 99999 PR PBB SHADOW E&M-EST. PATIENT-LVL III: ICD-10-PCS | Mod: PBBFAC,,, | Performed by: STUDENT IN AN ORGANIZED HEALTH CARE EDUCATION/TRAINING PROGRAM

## 2023-04-24 PROCEDURE — 3008F PR BODY MASS INDEX (BMI) DOCUMENTED: ICD-10-PCS | Mod: CPTII,S$GLB,, | Performed by: STUDENT IN AN ORGANIZED HEALTH CARE EDUCATION/TRAINING PROGRAM

## 2023-04-24 PROCEDURE — 3008F BODY MASS INDEX DOCD: CPT | Mod: CPTII,S$GLB,, | Performed by: STUDENT IN AN ORGANIZED HEALTH CARE EDUCATION/TRAINING PROGRAM

## 2023-04-24 PROCEDURE — 1159F PR MEDICATION LIST DOCUMENTED IN MEDICAL RECORD: ICD-10-PCS | Mod: CPTII,S$GLB,, | Performed by: STUDENT IN AN ORGANIZED HEALTH CARE EDUCATION/TRAINING PROGRAM

## 2023-04-24 PROCEDURE — 1160F PR REVIEW ALL MEDS BY PRESCRIBER/CLIN PHARMACIST DOCUMENTED: ICD-10-PCS | Mod: CPTII,S$GLB,, | Performed by: STUDENT IN AN ORGANIZED HEALTH CARE EDUCATION/TRAINING PROGRAM

## 2023-04-24 PROCEDURE — 3078F DIAST BP <80 MM HG: CPT | Mod: CPTII,S$GLB,, | Performed by: STUDENT IN AN ORGANIZED HEALTH CARE EDUCATION/TRAINING PROGRAM

## 2023-04-24 PROCEDURE — 88175 CYTOPATH C/V AUTO FLUID REDO: CPT | Performed by: STUDENT IN AN ORGANIZED HEALTH CARE EDUCATION/TRAINING PROGRAM

## 2023-04-24 PROCEDURE — 1160F RVW MEDS BY RX/DR IN RCRD: CPT | Mod: CPTII,S$GLB,, | Performed by: STUDENT IN AN ORGANIZED HEALTH CARE EDUCATION/TRAINING PROGRAM

## 2023-04-24 PROCEDURE — 3078F PR MOST RECENT DIASTOLIC BLOOD PRESSURE < 80 MM HG: ICD-10-PCS | Mod: CPTII,S$GLB,, | Performed by: STUDENT IN AN ORGANIZED HEALTH CARE EDUCATION/TRAINING PROGRAM

## 2023-04-24 PROCEDURE — 99999 PR PBB SHADOW E&M-EST. PATIENT-LVL III: CPT | Mod: PBBFAC,,, | Performed by: STUDENT IN AN ORGANIZED HEALTH CARE EDUCATION/TRAINING PROGRAM

## 2023-04-24 PROCEDURE — 87624 HPV HI-RISK TYP POOLED RSLT: CPT | Performed by: STUDENT IN AN ORGANIZED HEALTH CARE EDUCATION/TRAINING PROGRAM

## 2023-04-24 PROCEDURE — 1159F MED LIST DOCD IN RCRD: CPT | Mod: CPTII,S$GLB,, | Performed by: STUDENT IN AN ORGANIZED HEALTH CARE EDUCATION/TRAINING PROGRAM

## 2023-04-24 PROCEDURE — 99395 PR PREVENTIVE VISIT,EST,18-39: ICD-10-PCS | Mod: S$GLB,,, | Performed by: STUDENT IN AN ORGANIZED HEALTH CARE EDUCATION/TRAINING PROGRAM

## 2023-04-24 PROCEDURE — 3074F SYST BP LT 130 MM HG: CPT | Mod: CPTII,S$GLB,, | Performed by: STUDENT IN AN ORGANIZED HEALTH CARE EDUCATION/TRAINING PROGRAM

## 2023-04-24 NOTE — PROGRESS NOTES
[facility]    Subjective:   Chief Complaint:    Chief Complaint   Patient presents with    Annual Exam    STD CHECK       No LMP recorded (lmp unknown). Patient has had an implant.  Contraception: Nexplanon.  HRT: None.    Sotero Pelaez is a 37 y.o.  who presents for an annual exam.  The patient has no GYN complaints today.  She does want STD screening but declines bloodwork.  She participates in regular exercise: no.  She does not smoke.  She wears seatbelts.  She is not taking a multivitamin, vitamin D, and calcium.  She denies any domestic violence.    Nexplanon placed in . Does not have cycles while on the Nexplanon. No issues.     Last Pap: years ago. Denies any history of abnormal pap smears.  Last mammogram: N/A.     FH:  Breast cancer: maternal aunt ().  Colon cancer: none.  Endometrial cancer: none.  Ovarian cancer: none.      OB History    Para Term  AB Living   3 2 2   1 2   SAB IAB Ectopic Multiple Live Births   0       2      # Outcome Date GA Lbr Sushil/2nd Weight Sex Delivery Anes PTL Lv   3 Term 11   3.175 kg (7 lb) M Vag-Spont EPI N CORINA   2 AB 09           1 Term 08   2.838 kg (6 lb 4.1 oz) M Vag-Spont EPI N CORINA       Past Medical History:   Diagnosis Date    Anemia     Asthma     since younger    Herpes simplex without mention of complication     Obese      Past Surgical History:   Procedure Laterality Date    EYE SURGERY Bilateral 2016    Lasix     Review of patient's allergies indicates:  No Known Allergies    Social History     Socioeconomic History    Marital status: Single   Tobacco Use    Smoking status: Never    Smokeless tobacco: Never   Substance and Sexual Activity    Alcohol use: Yes     Comment: Seldomly    Drug use: Yes     Types: Marijuana     Comment: seldom    Sexual activity: Yes     Partners: Male     Birth control/protection: None, Implant     Comment: nexplanon       Family History   Problem Relation  "Age of Onset    Hypertension Mother     Diabetes Mother     Stroke Sister     Breast cancer Maternal Aunt     Cancer Maternal Aunt     Diabetes Maternal Aunt     Colon cancer Neg Hx     Ovarian cancer Neg Hx        Medications:  Current Outpatient Medications on File Prior to Visit   Medication Sig Dispense Refill Last Dose    [DISCONTINUED] etonogestreL (NEXPLANON) 68 mg Impl subdermal device by Subdermal route.       [DISCONTINUED] naproxen (NAPROSYN) 500 MG tablet Take 1 tablet (500 mg total) by mouth 2 (two) times daily with meals. 60 tablet 0        Review of Systems   Constitutional: Negative for appetite change, fever and unexpected weight change.   ENT: Negative for ear pain, tinnitus, sinus congestion or trouble swallowing.   Respiratory: Negative for cough and shortness of breath.    Cardiovascular: Negative for chest pain and palpitations.   Gastrointestinal: Negative for abdominal distention, constipation, nausea and vomiting.   Genitourinary: Negative for dyspareunia, dysuria, hematuria and pelvic pain.        GYN ROS per HPI.   Musculoskeletal: Negative for gait problem and myalgias.   Skin: Negative for rash.   Neurological: Negative for dizziness, light-headedness and headaches.   Psychiatric/Behavioral: The patient is not nervous/anxious.      Objective:   /74 (BP Location: Right arm, Patient Position: Sitting, BP Method: Large (Manual))   Resp 16   Ht 5' 3" (1.6 m)   Wt 115.2 kg (253 lb 15.5 oz)   LMP  (LMP Unknown)   BMI 44.99 kg/m²     Physical Exam  Vitals reviewed. Exam conducted with a chaperone present.   Constitutional:       Appearance: She is obese.   HENT:      Head: Normocephalic and atraumatic.   Cardiovascular:      Rate and Rhythm: Normal rate and regular rhythm.   Pulmonary:      Effort: Pulmonary effort is normal. No accessory muscle usage or respiratory distress.   Chest:      Chest wall: No tenderness.   Breasts:     Breasts are symmetrical.      Right: No inverted " nipple, mass, nipple discharge, skin change or tenderness.      Left: No inverted nipple, mass, nipple discharge, skin change or tenderness.   Abdominal:      General: Abdomen is flat.      Tenderness: There is no abdominal tenderness. There is no guarding.   Genitourinary:     General: Normal vulva.      Pubic Area: No rash.       Labia:         Right: No rash or tenderness.         Left: No rash or tenderness.       Urethra: No prolapse.      Vagina: Normal. No tenderness.      Cervix: No cervical motion tenderness, erythema or cervical bleeding.      Uterus: Not tender.       Adnexa:         Right: No mass or tenderness.          Left: No mass or tenderness.     Musculoskeletal:      Right lower leg: No edema.      Left lower leg: No edema.   Lymphadenopathy:      Upper Body:      Right upper body: No axillary adenopathy.      Left upper body: No axillary adenopathy.   Skin:     Comments: Nexplanon palpated in proper position in left upper arm.   Neurological:      General: No focal deficit present.      Mental Status: She is alert. Mental status is at baseline.        Assessment:     1. Well woman exam with routine gynecological exam    2. Screening for malignant neoplasm of cervix    3. Contact with and (suspected) exposure to other communicable diseases    4. Nexplanon in place      Plan:     37 y.o. female presents today for annual pap smear and exam.     1. Well woman exam with routine gynecological exam    2. Screening for malignant neoplasm of cervix  - Liquid-Based Pap Smear, Screening  - HPV High Risk Genotypes, PCR    3. Contact with and (suspected) exposure to other communicable diseases  - C. trachomatis/N. gonorrhoeae by AMP DNA [facility]; Cervicovaginal  - Vaginosis Screen by DNA Probe    4. Nexplanon in place      Follow up in about 1 year (around 4/24/2024) for annual exam or sooner if any GYN issues arise.    The above was reviewed and discussed with the patient.    Annual exam and screening issues  based on the patient's age and family history were discussed.       - Pap and HPV testing performed.   - Mammogram N/A.  - Colonoscopy N/A.  - Tobacco cessation N/A.  - DEXA N/A.  - Counseled to take daily multivitamin. If patient is of reproductive age and not on contraception, to take prenatal vitamin. Patient has been counseled on the vitamin D and calcium requirements per ACOG recommendations.    Age    Calcium(mg/day)    Vitamin D (IU/day)  9-18     1300                       600  19-50   1000                       600  51-70   1200                       600  >70       1200                       800    STI testing.     The patient's questions were answered, and she agrees with the current plan.     The patient was counseled today on the new ACS guidelines for cervical cytology screening as well as the current recommendations for breast cancer screening. She was counseled to follow up with her PCP for other routine health maintenance.       Lisa Pruett PA-C  04/24/2023

## 2023-04-27 DIAGNOSIS — N76.0 BACTERIAL VAGINOSIS: Primary | ICD-10-CM

## 2023-04-27 DIAGNOSIS — B96.89 BACTERIAL VAGINOSIS: Primary | ICD-10-CM

## 2023-04-27 LAB
BACTERIAL VAGINOSIS DNA: POSITIVE
C TRACH DNA SPEC QL NAA+PROBE: NOT DETECTED
CANDIDA GLABRATA DNA: POSITIVE
CANDIDA KRUSEI DNA: NEGATIVE
CANDIDA RRNA VAG QL PROBE: NEGATIVE
N GONORRHOEA DNA SPEC QL NAA+PROBE: NOT DETECTED
T VAGINALIS RRNA GENITAL QL PROBE: NEGATIVE

## 2023-04-27 RX ORDER — METRONIDAZOLE 7.5 MG/G
GEL VAGINAL
Qty: 70 G | Refills: 0 | Status: SHIPPED | OUTPATIENT
Start: 2023-04-27 | End: 2023-12-30

## 2023-04-29 LAB
FINAL PATHOLOGIC DIAGNOSIS: NORMAL
Lab: NORMAL

## 2023-05-02 ENCOUNTER — TELEPHONE (OUTPATIENT)
Dept: OBSTETRICS AND GYNECOLOGY | Facility: CLINIC | Age: 38
End: 2023-05-02
Payer: COMMERCIAL

## 2023-05-02 LAB
HPV HR 12 DNA SPEC QL NAA+PROBE: NEGATIVE
HPV16 AG SPEC QL: NEGATIVE
HPV18 DNA SPEC QL NAA+PROBE: NEGATIVE

## 2023-05-02 NOTE — TELEPHONE ENCOUNTER
----- Message from Ryan Schroeder sent at 5/2/2023  2:39 PM CDT -----  Type: Needs Medical Advice  Who Called:  pt  Best Call Back Number: [phone]  Additional Information: pt is calling the office to see is she needs to use both medications at the same time. Please message in the portal to advise.Thanks!

## 2023-12-21 ENCOUNTER — OFFICE VISIT (OUTPATIENT)
Dept: FAMILY MEDICINE | Facility: CLINIC | Age: 38
End: 2023-12-21
Payer: COMMERCIAL

## 2023-12-21 VITALS
TEMPERATURE: 97 F | OXYGEN SATURATION: 98 % | BODY MASS INDEX: 46.6 KG/M2 | DIASTOLIC BLOOD PRESSURE: 80 MMHG | HEART RATE: 83 BPM | SYSTOLIC BLOOD PRESSURE: 122 MMHG | HEIGHT: 63 IN | WEIGHT: 263 LBS

## 2023-12-21 DIAGNOSIS — L02.92 BOILS: ICD-10-CM

## 2023-12-21 DIAGNOSIS — R06.83 SNORING: ICD-10-CM

## 2023-12-21 DIAGNOSIS — R53.83 FATIGUE, UNSPECIFIED TYPE: ICD-10-CM

## 2023-12-21 DIAGNOSIS — G47.8 NON-RESTORATIVE SLEEP: ICD-10-CM

## 2023-12-21 DIAGNOSIS — Z00.00 PHYSICAL EXAM: Primary | ICD-10-CM

## 2023-12-21 PROCEDURE — 1159F PR MEDICATION LIST DOCUMENTED IN MEDICAL RECORD: ICD-10-PCS | Mod: CPTII,S$GLB,, | Performed by: FAMILY MEDICINE

## 2023-12-21 PROCEDURE — 1160F PR REVIEW ALL MEDS BY PRESCRIBER/CLIN PHARMACIST DOCUMENTED: ICD-10-PCS | Mod: CPTII,S$GLB,, | Performed by: FAMILY MEDICINE

## 2023-12-21 PROCEDURE — 3044F HG A1C LEVEL LT 7.0%: CPT | Mod: CPTII,S$GLB,, | Performed by: FAMILY MEDICINE

## 2023-12-21 PROCEDURE — 3079F PR MOST RECENT DIASTOLIC BLOOD PRESSURE 80-89 MM HG: ICD-10-PCS | Mod: CPTII,S$GLB,, | Performed by: FAMILY MEDICINE

## 2023-12-21 PROCEDURE — 3079F DIAST BP 80-89 MM HG: CPT | Mod: CPTII,S$GLB,, | Performed by: FAMILY MEDICINE

## 2023-12-21 PROCEDURE — 99213 PR OFFICE/OUTPT VISIT, EST, LEVL III, 20-29 MIN: ICD-10-PCS | Mod: 25,S$GLB,, | Performed by: FAMILY MEDICINE

## 2023-12-21 PROCEDURE — 90471 IMMUNIZATION ADMIN: CPT | Mod: S$GLB,,, | Performed by: FAMILY MEDICINE

## 2023-12-21 PROCEDURE — 3074F SYST BP LT 130 MM HG: CPT | Mod: CPTII,S$GLB,, | Performed by: FAMILY MEDICINE

## 2023-12-21 PROCEDURE — 3044F PR MOST RECENT HEMOGLOBIN A1C LEVEL <7.0%: ICD-10-PCS | Mod: CPTII,S$GLB,, | Performed by: FAMILY MEDICINE

## 2023-12-21 PROCEDURE — 99385 PR PREVENTIVE VISIT,NEW,18-39: ICD-10-PCS | Mod: 25,S$GLB,, | Performed by: FAMILY MEDICINE

## 2023-12-21 PROCEDURE — 99385 PREV VISIT NEW AGE 18-39: CPT | Mod: 25,S$GLB,, | Performed by: FAMILY MEDICINE

## 2023-12-21 PROCEDURE — 3074F PR MOST RECENT SYSTOLIC BLOOD PRESSURE < 130 MM HG: ICD-10-PCS | Mod: CPTII,S$GLB,, | Performed by: FAMILY MEDICINE

## 2023-12-21 PROCEDURE — 1160F RVW MEDS BY RX/DR IN RCRD: CPT | Mod: CPTII,S$GLB,, | Performed by: FAMILY MEDICINE

## 2023-12-21 PROCEDURE — 90715 TDAP VACCINE GREATER THAN OR EQUAL TO 7YO IM: ICD-10-PCS | Mod: S$GLB,,, | Performed by: FAMILY MEDICINE

## 2023-12-21 PROCEDURE — 1159F MED LIST DOCD IN RCRD: CPT | Mod: CPTII,S$GLB,, | Performed by: FAMILY MEDICINE

## 2023-12-21 PROCEDURE — 99213 OFFICE O/P EST LOW 20 MIN: CPT | Mod: 25,S$GLB,, | Performed by: FAMILY MEDICINE

## 2023-12-21 PROCEDURE — 90471 TDAP VACCINE GREATER THAN OR EQUAL TO 7YO IM: ICD-10-PCS | Mod: S$GLB,,, | Performed by: FAMILY MEDICINE

## 2023-12-21 PROCEDURE — 90715 TDAP VACCINE 7 YRS/> IM: CPT | Mod: S$GLB,,, | Performed by: FAMILY MEDICINE

## 2023-12-21 PROCEDURE — 3008F PR BODY MASS INDEX (BMI) DOCUMENTED: ICD-10-PCS | Mod: CPTII,S$GLB,, | Performed by: FAMILY MEDICINE

## 2023-12-21 PROCEDURE — 3008F BODY MASS INDEX DOCD: CPT | Mod: CPTII,S$GLB,, | Performed by: FAMILY MEDICINE

## 2023-12-21 RX ORDER — DOXYCYCLINE 100 MG/1
100 CAPSULE ORAL 2 TIMES DAILY
Qty: 20 CAPSULE | Refills: 0 | Status: SHIPPED | OUTPATIENT
Start: 2023-12-21 | End: 2024-04-01 | Stop reason: CLARIF

## 2023-12-22 ENCOUNTER — LAB VISIT (OUTPATIENT)
Dept: LAB | Facility: HOSPITAL | Age: 38
End: 2023-12-22
Attending: FAMILY MEDICINE
Payer: COMMERCIAL

## 2023-12-22 DIAGNOSIS — Z00.00 PHYSICAL EXAM: ICD-10-CM

## 2023-12-22 LAB
ALBUMIN SERPL BCP-MCNC: 4.3 G/DL (ref 3.5–5.2)
ALP SERPL-CCNC: 62 U/L (ref 55–135)
ALT SERPL W/O P-5'-P-CCNC: 25 U/L (ref 10–44)
ANION GAP SERPL CALC-SCNC: 9 MMOL/L (ref 8–16)
AST SERPL-CCNC: 18 U/L (ref 10–40)
BASOPHILS # BLD AUTO: 0.01 K/UL (ref 0–0.2)
BASOPHILS NFR BLD: 0.1 % (ref 0–1.9)
BILIRUB SERPL-MCNC: 0.4 MG/DL (ref 0.1–1)
BUN SERPL-MCNC: 15 MG/DL (ref 6–20)
CALCIUM SERPL-MCNC: 9.8 MG/DL (ref 8.7–10.5)
CHLORIDE SERPL-SCNC: 101 MMOL/L (ref 95–110)
CHOLEST SERPL-MCNC: 182 MG/DL (ref 120–199)
CHOLEST/HDLC SERPL: 3.7 {RATIO} (ref 2–5)
CO2 SERPL-SCNC: 27 MMOL/L (ref 23–29)
CREAT SERPL-MCNC: 0.7 MG/DL (ref 0.5–1.4)
DIFFERENTIAL METHOD: ABNORMAL
EOSINOPHIL # BLD AUTO: 0.1 K/UL (ref 0–0.5)
EOSINOPHIL NFR BLD: 1.6 % (ref 0–8)
ERYTHROCYTE [DISTWIDTH] IN BLOOD BY AUTOMATED COUNT: 13.2 % (ref 11.5–14.5)
EST. GFR  (NO RACE VARIABLE): >60 ML/MIN/1.73 M^2
ESTIMATED AVG GLUCOSE: 137 MG/DL (ref 68–131)
FERRITIN SERPL-MCNC: 183.8 NG/ML (ref 20–300)
GLUCOSE SERPL-MCNC: 142 MG/DL (ref 70–110)
HBA1C MFR BLD: 6.4 % (ref 4.5–6.2)
HCT VFR BLD AUTO: 37.7 % (ref 37–48.5)
HDLC SERPL-MCNC: 49 MG/DL (ref 40–75)
HDLC SERPL: 26.9 % (ref 20–50)
HGB BLD-MCNC: 12.2 G/DL (ref 12–16)
IMM GRANULOCYTES # BLD AUTO: 0.02 K/UL (ref 0–0.04)
IMM GRANULOCYTES NFR BLD AUTO: 0.3 % (ref 0–0.5)
LDLC SERPL CALC-MCNC: 112.8 MG/DL (ref 63–159)
LYMPHOCYTES # BLD AUTO: 2.3 K/UL (ref 1–4.8)
LYMPHOCYTES NFR BLD: 34.6 % (ref 18–48)
MCH RBC QN AUTO: 28 PG (ref 27–31)
MCHC RBC AUTO-ENTMCNC: 32.4 G/DL (ref 32–36)
MCV RBC AUTO: 87 FL (ref 82–98)
MONOCYTES # BLD AUTO: 0.6 K/UL (ref 0.3–1)
MONOCYTES NFR BLD: 8.8 % (ref 4–15)
NEUTROPHILS # BLD AUTO: 3.7 K/UL (ref 1.8–7.7)
NEUTROPHILS NFR BLD: 54.6 % (ref 38–73)
NONHDLC SERPL-MCNC: 133 MG/DL
NRBC BLD-RTO: 0 /100 WBC
PLATELET # BLD AUTO: 297 K/UL (ref 150–450)
PMV BLD AUTO: 9.1 FL (ref 9.2–12.9)
POTASSIUM SERPL-SCNC: 4.1 MMOL/L (ref 3.5–5.1)
PROT SERPL-MCNC: 7.8 G/DL (ref 6–8.4)
RBC # BLD AUTO: 4.35 M/UL (ref 4–5.4)
SODIUM SERPL-SCNC: 137 MMOL/L (ref 136–145)
TRIGL SERPL-MCNC: 101 MG/DL (ref 30–150)
TSH SERPL DL<=0.005 MIU/L-ACNC: 2.26 UIU/ML (ref 0.34–5.6)
WBC # BLD AUTO: 6.7 K/UL (ref 3.9–12.7)

## 2023-12-22 PROCEDURE — 85025 COMPLETE CBC W/AUTO DIFF WBC: CPT | Performed by: FAMILY MEDICINE

## 2023-12-22 PROCEDURE — 84443 ASSAY THYROID STIM HORMONE: CPT | Performed by: FAMILY MEDICINE

## 2023-12-22 PROCEDURE — 82728 ASSAY OF FERRITIN: CPT | Performed by: FAMILY MEDICINE

## 2023-12-22 PROCEDURE — 36415 COLL VENOUS BLD VENIPUNCTURE: CPT | Performed by: FAMILY MEDICINE

## 2023-12-22 PROCEDURE — 83036 HEMOGLOBIN GLYCOSYLATED A1C: CPT | Performed by: FAMILY MEDICINE

## 2023-12-22 PROCEDURE — 80061 LIPID PANEL: CPT | Performed by: FAMILY MEDICINE

## 2023-12-22 PROCEDURE — 80053 COMPREHEN METABOLIC PANEL: CPT | Performed by: FAMILY MEDICINE

## 2023-12-24 NOTE — PROGRESS NOTES
Subjective:       Patient ID: Sotero Pelaez is a 38 y.o. female.    Chief Complaint: Annual Exam    Here for new patient visit.  Last here over 3 years ago.    Social history nonsmoker no alcohol intake of significance.  Works for the [employer] from home.    Family history diabetes mellitus type 2 mother and sister.  Breast cancer in maternal aunt.    Immunizations needs tetanus diptheria pertussis vaccine.    Past medical history.  Has had LASIK.   3 para 2 AB1.  Children 15 and 12.  Rare cycles.  Birth control implant.      Review of Systems   Constitutional:  Positive for fatigue.        Snoring fatigue and non restorative sleep.   HENT: Negative.          Snoring.   Eyes: Negative.    Respiratory: Negative.     Cardiovascular: Negative.    Gastrointestinal: Negative.    Endocrine: Negative.    Genitourinary: Negative.    Musculoskeletal: Negative.    Skin:  Positive for wound.   Allergic/Immunologic: Negative.    Neurological: Negative.    Hematological: Negative.    Psychiatric/Behavioral: Negative.     All other systems reviewed and are negative.      Objective:      Physical Exam  Skin:     Comments: Boil right medial proximal thigh.         Assessment:       1. Physical exam    2. Fatigue, unspecified type    3. Boils    4. Snoring    5. BMI 45.0-49.9, adult    6. Non-restorative sleep        Plan:       Physical exam  -     CBC Auto Differential; Future; Expected date: 2023  -     Comprehensive Metabolic Panel; Future; Expected date: 2023  -     Lipid Panel; Future; Expected date: 2023  -     TSH; Future; Expected date: 2023  -     Ferritin; Future; Expected date: 2023  -     Hemoglobin A1C; Future; Expected date: 2023    Fatigue, unspecified type  -     Home Sleep Study; Future    Boils    Snoring  -     Home Sleep Study; Future    BMI 45.0-49.9, adult    Non-restorative sleep  -     Home Sleep Study; Future    Other orders  -     Tdap  Vaccine  -     doxycycline (VIBRAMYCIN) 100 MG Cap; Take 1 capsule (100 mg total) by mouth 2 (two) times a day.  Dispense: 20 capsule; Refill: 0    Has part of routine physical get CBC CMP lipids TSH.  TD AP administered.  Declines flu shot.    In addition to routine physical.  Also has fatigue snoring non restorative sleep.  BMI of 46.  Has gained quite a bit of weight.  Also has an infected area on the right inner thigh.  No fever chills.    Physical examination.  Neck without bruit.  Chest clear.  Heart regular rate and rhythm.  Right medial inner thigh proximally.  Tender area about 2 cm in diameter.  No fluctuance.    Impression.  Fatigue snoring non restorative sleep.  Morbid obesity BMI of 46.6 and boil right medial thigh.      Plan routine labs plus TSH ferritin and A1c Vibramycin 100 mg b.i.d. for 10 days.  Home sleep study.

## 2023-12-27 ENCOUNTER — PATIENT MESSAGE (OUTPATIENT)
Dept: FAMILY MEDICINE | Facility: CLINIC | Age: 38
End: 2023-12-27
Payer: COMMERCIAL

## 2023-12-29 ENCOUNTER — OFFICE VISIT (OUTPATIENT)
Dept: FAMILY MEDICINE | Facility: CLINIC | Age: 38
End: 2023-12-29
Payer: COMMERCIAL

## 2023-12-29 VITALS
WEIGHT: 267 LBS | DIASTOLIC BLOOD PRESSURE: 84 MMHG | RESPIRATION RATE: 18 BRPM | OXYGEN SATURATION: 97 % | BODY MASS INDEX: 47.31 KG/M2 | HEART RATE: 88 BPM | SYSTOLIC BLOOD PRESSURE: 122 MMHG | TEMPERATURE: 97 F | HEIGHT: 63 IN

## 2023-12-29 DIAGNOSIS — R73.03 PREDIABETES: Primary | ICD-10-CM

## 2023-12-29 DIAGNOSIS — E66.01 MORBID OBESITY: ICD-10-CM

## 2023-12-29 PROCEDURE — 99213 PR OFFICE/OUTPT VISIT, EST, LEVL III, 20-29 MIN: ICD-10-PCS | Mod: S$GLB,,, | Performed by: FAMILY MEDICINE

## 2023-12-29 PROCEDURE — 1159F MED LIST DOCD IN RCRD: CPT | Mod: CPTII,S$GLB,, | Performed by: FAMILY MEDICINE

## 2023-12-29 PROCEDURE — 3074F SYST BP LT 130 MM HG: CPT | Mod: CPTII,S$GLB,, | Performed by: FAMILY MEDICINE

## 2023-12-29 PROCEDURE — 3079F DIAST BP 80-89 MM HG: CPT | Mod: CPTII,S$GLB,, | Performed by: FAMILY MEDICINE

## 2023-12-29 PROCEDURE — 1160F RVW MEDS BY RX/DR IN RCRD: CPT | Mod: CPTII,S$GLB,, | Performed by: FAMILY MEDICINE

## 2023-12-29 PROCEDURE — 99213 OFFICE O/P EST LOW 20 MIN: CPT | Mod: S$GLB,,, | Performed by: FAMILY MEDICINE

## 2023-12-29 PROCEDURE — 3074F PR MOST RECENT SYSTOLIC BLOOD PRESSURE < 130 MM HG: ICD-10-PCS | Mod: CPTII,S$GLB,, | Performed by: FAMILY MEDICINE

## 2023-12-29 PROCEDURE — 3079F PR MOST RECENT DIASTOLIC BLOOD PRESSURE 80-89 MM HG: ICD-10-PCS | Mod: CPTII,S$GLB,, | Performed by: FAMILY MEDICINE

## 2023-12-29 PROCEDURE — 3044F PR MOST RECENT HEMOGLOBIN A1C LEVEL <7.0%: ICD-10-PCS | Mod: CPTII,S$GLB,, | Performed by: FAMILY MEDICINE

## 2023-12-29 PROCEDURE — 1159F PR MEDICATION LIST DOCUMENTED IN MEDICAL RECORD: ICD-10-PCS | Mod: CPTII,S$GLB,, | Performed by: FAMILY MEDICINE

## 2023-12-29 PROCEDURE — 1160F PR REVIEW ALL MEDS BY PRESCRIBER/CLIN PHARMACIST DOCUMENTED: ICD-10-PCS | Mod: CPTII,S$GLB,, | Performed by: FAMILY MEDICINE

## 2023-12-29 PROCEDURE — 3008F BODY MASS INDEX DOCD: CPT | Mod: CPTII,S$GLB,, | Performed by: FAMILY MEDICINE

## 2023-12-29 PROCEDURE — 3008F PR BODY MASS INDEX (BMI) DOCUMENTED: ICD-10-PCS | Mod: CPTII,S$GLB,, | Performed by: FAMILY MEDICINE

## 2023-12-29 PROCEDURE — 3044F HG A1C LEVEL LT 7.0%: CPT | Mod: CPTII,S$GLB,, | Performed by: FAMILY MEDICINE

## 2023-12-29 RX ORDER — METFORMIN HYDROCHLORIDE 500 MG/1
500 TABLET, EXTENDED RELEASE ORAL 2 TIMES DAILY WITH MEALS
COMMUNITY
End: 2023-12-29 | Stop reason: SDUPTHER

## 2023-12-30 PROBLEM — R73.03 PREDIABETES: Status: ACTIVE | Noted: 2023-12-30

## 2023-12-30 RX ORDER — METFORMIN HYDROCHLORIDE 500 MG/1
500 TABLET, EXTENDED RELEASE ORAL 2 TIMES DAILY WITH MEALS
Qty: 180 TABLET | Refills: 1 | Status: SHIPPED | OUTPATIENT
Start: 2023-12-30 | End: 2024-04-01 | Stop reason: CLARIF

## 2023-12-30 NOTE — PROGRESS NOTES
Subjective:       Patient ID: Sotero Pelaez is a 38 y.o. female.    Chief Complaint: Results    Follow-up of labs.  Ferritin 183 TSH 2.26.  Cholesterol 182 with HDL of 49 LDL of 113 hemoglobin 12.2 fasting sugar 142 previously 87 and 75 diet is very poor.  A1c is up to 6.4 from 5.8 several years ago.    Physical examination.  Vital signs noted.  Chest clear.  Heart regular rate rhythm.            Objective:        Assessment:       1. Prediabetes    2. BMI 45.0-49.9, adult    3. Morbid obesity        Plan:       Prediabetes  -     Hemoglobin A1C; Future; Expected date: 03/29/2024    BMI 45.0-49.9, adult    Morbid obesity    Long discussion of diet measures.  Low-fat diet stress.  Metformin  b.i.d..  One hundred eighty with a refill.  Follow-up in 3 months with A1c.  If she does not make major dietary changes will probably be diabetic by then.  Patient voices understanding.

## 2024-01-10 ENCOUNTER — OFFICE VISIT (OUTPATIENT)
Dept: URGENT CARE | Facility: CLINIC | Age: 39
End: 2024-01-10
Payer: COMMERCIAL

## 2024-01-10 VITALS
SYSTOLIC BLOOD PRESSURE: 137 MMHG | HEART RATE: 96 BPM | TEMPERATURE: 97 F | HEIGHT: 63 IN | BODY MASS INDEX: 46.21 KG/M2 | RESPIRATION RATE: 20 BRPM | DIASTOLIC BLOOD PRESSURE: 84 MMHG | WEIGHT: 260.81 LBS | OXYGEN SATURATION: 99 %

## 2024-01-10 DIAGNOSIS — J02.9 SORE THROAT: Primary | ICD-10-CM

## 2024-01-10 DIAGNOSIS — J02.0 STREP PHARYNGITIS: ICD-10-CM

## 2024-01-10 LAB
CTP QC/QA: YES
S PYO RRNA THROAT QL PROBE: POSITIVE

## 2024-01-10 PROCEDURE — 87880 STREP A ASSAY W/OPTIC: CPT | Mod: QW,,, | Performed by: PHYSICIAN ASSISTANT

## 2024-01-10 PROCEDURE — 96372 THER/PROPH/DIAG INJ SC/IM: CPT | Mod: S$GLB,,, | Performed by: PHYSICIAN ASSISTANT

## 2024-01-10 PROCEDURE — 99214 OFFICE O/P EST MOD 30 MIN: CPT | Mod: 25,S$GLB,, | Performed by: PHYSICIAN ASSISTANT

## 2024-01-10 RX ORDER — IBUPROFEN 600 MG/1
600 TABLET ORAL EVERY 8 HOURS PRN
Qty: 20 TABLET | Refills: 0 | Status: SHIPPED | OUTPATIENT
Start: 2024-01-10

## 2024-01-10 RX ORDER — KETOROLAC TROMETHAMINE 30 MG/ML
30 INJECTION, SOLUTION INTRAMUSCULAR; INTRAVENOUS
Status: COMPLETED | OUTPATIENT
Start: 2024-01-10 | End: 2024-01-10

## 2024-01-10 RX ORDER — AMOXICILLIN 500 MG/1
1000 TABLET, FILM COATED ORAL DAILY
Qty: 20 TABLET | Refills: 0 | Status: SHIPPED | OUTPATIENT
Start: 2024-01-10 | End: 2024-01-20

## 2024-01-10 RX ADMIN — KETOROLAC TROMETHAMINE 30 MG: 30 INJECTION, SOLUTION INTRAMUSCULAR; INTRAVENOUS at 10:01

## 2024-01-10 NOTE — PROGRESS NOTES
"Subjective:      Patient ID: Sotero Pelaez is a 38 y.o. female.    Vitals:  height is 5' 3" (1.6 m) and weight is 118.3 kg (260 lb 12.8 oz). Her oral temperature is 97.3 °F (36.3 °C). Her blood pressure is 137/84 and her pulse is 96. Her respiration is 20 and oxygen saturation is 99%.     Chief Complaint: Sore Throat    Sotero Pelaez is a 38 y.o. female presenting for evaluation of sore throat, persisting for the last several days. No fever, no chills.  She states the pain worsens with swallowing.  She has a past medical history of Anemia, Asthma, Herpes simplex without mention of complication, and Obese.      Sore Throat   This is a new problem. Episode onset: 4 days ago. The problem has been gradually worsening. The pain is worse on the right side. There has been no fever. Associated symptoms include swollen glands and trouble swallowing (painful swallowing). Pertinent negatives include no abdominal pain, congestion, coughing, diarrhea, headaches, neck pain or shortness of breath. She has tried gargles (Chloraseptic spray) for the symptoms. The treatment provided no relief.       Constitution: Negative for chills and fever.   HENT:  Positive for sore throat and trouble swallowing (painful swallowing). Negative for congestion.    Neck: Negative for neck pain.   Cardiovascular:  Negative for chest pain and palpitations.   Respiratory:  Negative for cough, shortness of breath and asthma.    Gastrointestinal:  Negative for abdominal pain, nausea, constipation and diarrhea.   Musculoskeletal:  Negative for pain.   Skin:  Negative for rash.   Allergic/Immunologic: Negative for asthma.   Neurological:  Negative for dizziness, light-headedness, headaches, numbness and tingling.   Psychiatric/Behavioral:  Negative for nervous/anxious. The patient is not nervous/anxious.       Objective:     Physical Exam   Constitutional: She is oriented to person, place, and time. She does not appear ill. No distress.   HENT: "   Head: Normocephalic and atraumatic.   Nose: No rhinorrhea or congestion.   Mouth/Throat: Mucous membranes are moist. Posterior oropharyngeal erythema present. No oropharyngeal exudate.      Comments: Mild erythema noted to posterior oropharynx without edema or exudate.  No trismus.  No pooling of secretions.  Uvula midline.  Normal phonation.  Eyes: Conjunctivae are normal.   Cardiovascular: Normal rate, regular rhythm and normal pulses.   Pulmonary/Chest: Effort normal and breath sounds normal. No respiratory distress. She has no wheezes. She has no rales.   Abdominal: Normal appearance.   Musculoskeletal: Normal range of motion.         General: Normal range of motion.   Neurological: no focal deficit. She is alert and oriented to person, place, and time. No sensory deficit.   Skin: Skin is warm, dry and no rash.   Psychiatric: Mood normal.   Nursing note and vitals reviewed.    Assessment:     1. Sore throat    2. Strep pharyngitis        Plan:       Sore throat  -     POCT rapid strep A  -     ketorolac injection 30 mg  -     amoxicillin (AMOXIL) 500 MG Tab; Take 2 tablets (1,000 mg total) by mouth once daily. for 10 days  Dispense: 20 tablet; Refill: 0  -     ibuprofen (ADVIL,MOTRIN) 600 MG tablet; Take 1 tablet (600 mg total) by mouth every 8 (eight) hours as needed for Pain.  Dispense: 20 tablet; Refill: 0    Strep pharyngitis  -     ketorolac injection 30 mg  -     amoxicillin (AMOXIL) 500 MG Tab; Take 2 tablets (1,000 mg total) by mouth once daily. for 10 days  Dispense: 20 tablet; Refill: 0  -     ibuprofen (ADVIL,MOTRIN) 600 MG tablet; Take 1 tablet (600 mg total) by mouth every 8 (eight) hours as needed for Pain.  Dispense: 20 tablet; Refill: 0          Medical Decision Making:   History:   Old Medical Records: I decided to obtain old medical records.  Old Records Summarized: records from clinic visits and records from previous admission(s).  Differential Diagnosis:   Influenza  Pneumonia  Strep  pharyngitis  Meningitis  Viral syndrome      Clinical Tests:   Lab Tests: Ordered and Reviewed  Urgent Care Management:  Rapid strep is positive.  Patient is well-appearing.  Low suspicion for deeper space infection or peritonsillar abscess.  We will send prescription for amoxicillin to the pharmacy.  She is encouraged to follow up with her primary care provider for re-evaluation and further management as needed.  She voices understanding and is agreeable with the plan.  She is given specific return precautions.

## 2024-01-11 ENCOUNTER — TELEPHONE (OUTPATIENT)
Dept: FAMILY MEDICINE | Facility: CLINIC | Age: 39
End: 2024-01-11
Payer: COMMERCIAL

## 2024-03-22 ENCOUNTER — TELEPHONE (OUTPATIENT)
Dept: OBSTETRICS AND GYNECOLOGY | Facility: CLINIC | Age: 39
End: 2024-03-22
Payer: COMMERCIAL

## 2024-04-01 ENCOUNTER — OFFICE VISIT (OUTPATIENT)
Dept: OBSTETRICS AND GYNECOLOGY | Facility: CLINIC | Age: 39
End: 2024-04-01
Payer: COMMERCIAL

## 2024-04-01 ENCOUNTER — LAB VISIT (OUTPATIENT)
Dept: LAB | Facility: HOSPITAL | Age: 39
End: 2024-04-01
Attending: FAMILY MEDICINE
Payer: COMMERCIAL

## 2024-04-01 ENCOUNTER — HOSPITAL ENCOUNTER (EMERGENCY)
Facility: HOSPITAL | Age: 39
Discharge: HOME | End: 2024-04-01
Attending: EMERGENCY MEDICINE
Payer: COMMERCIAL

## 2024-04-01 VITALS — WEIGHT: 262.56 LBS | HEIGHT: 63 IN | BODY MASS INDEX: 46.52 KG/M2

## 2024-04-01 VITALS
DIASTOLIC BLOOD PRESSURE: 78 MMHG | OXYGEN SATURATION: 99 % | WEIGHT: 262 LBS | TEMPERATURE: 99 F | HEART RATE: 72 BPM | SYSTOLIC BLOOD PRESSURE: 132 MMHG | HEIGHT: 63 IN | BODY MASS INDEX: 46.42 KG/M2 | RESPIRATION RATE: 16 BRPM

## 2024-04-01 DIAGNOSIS — S16.1XXA STRAIN OF NECK MUSCLE, INITIAL ENCOUNTER: ICD-10-CM

## 2024-04-01 DIAGNOSIS — N64.4 BREAST TENDERNESS: Primary | ICD-10-CM

## 2024-04-01 DIAGNOSIS — Z31.69 ENCOUNTER FOR PRECONCEPTION CONSULTATION: ICD-10-CM

## 2024-04-01 DIAGNOSIS — R73.03 PREDIABETES: ICD-10-CM
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
## 2024-04-01 LAB
B-HCG UR QL: NEGATIVE
CTP QC/QA: YES

## 2024-04-01 PROCEDURE — 99999 PR PBB SHADOW E&M-EST. PATIENT-LVL III: CPT | Mod: PBBFAC,,, | Performed by: SPECIALIST

## 2024-04-01 PROCEDURE — 96372 THER/PROPH/DIAG INJ SC/IM: CPT | Performed by: PHYSICIAN ASSISTANT

## 2024-04-01 PROCEDURE — 1159F MED LIST DOCD IN RCRD: CPT | Mod: CPTII,S$GLB,, | Performed by: SPECIALIST

## 2024-04-01 PROCEDURE — 99284 EMERGENCY DEPT VISIT MOD MDM: CPT | Mod: 25

## 2024-04-01 PROCEDURE — 63600175 PHARM REV CODE 636 W HCPCS: Performed by: PHYSICIAN ASSISTANT

## 2024-04-01 PROCEDURE — 99203 OFFICE O/P NEW LOW 30 MIN: CPT | Mod: S$GLB,,, | Performed by: SPECIALIST

## 2024-04-01 PROCEDURE — 36415 COLL VENOUS BLD VENIPUNCTURE: CPT | Performed by: FAMILY MEDICINE

## 2024-04-01 PROCEDURE — 83036 HEMOGLOBIN GLYCOSYLATED A1C: CPT | Performed by: FAMILY MEDICINE

## 2024-04-01 PROCEDURE — 3008F BODY MASS INDEX DOCD: CPT | Mod: CPTII,S$GLB,, | Performed by: SPECIALIST

## 2024-04-01 PROCEDURE — 81025 URINE PREGNANCY TEST: CPT | Performed by: PHYSICIAN ASSISTANT

## 2024-04-01 RX ORDER — CYCLOBENZAPRINE HCL 10 MG
10 TABLET ORAL 3 TIMES DAILY PRN
Qty: 21 TABLET | Refills: 0 | Status: SHIPPED | OUTPATIENT
Start: 2024-04-01 | End: 2024-04-08

## 2024-04-01 RX ORDER — NAPROXEN 500 MG/1
500 TABLET ORAL 2 TIMES DAILY WITH MEALS
Qty: 14 TABLET | Refills: 0 | Status: SHIPPED | OUTPATIENT
Start: 2024-04-01 | End: 2024-04-08

## 2024-04-01 RX ORDER — KETOROLAC TROMETHAMINE 30 MG/ML
30 INJECTION, SOLUTION INTRAMUSCULAR; INTRAVENOUS
Status: COMPLETED | OUTPATIENT
Start: 2024-04-01 | End: 2024-04-01

## 2024-04-01 RX ADMIN — KETOROLAC TROMETHAMINE 30 MG: 30 INJECTION, SOLUTION INTRAMUSCULAR at 11:04

## 2024-04-01 NOTE — ED NOTES
Lights off for comfort. Warm blanket offered, patient declined. She is resting in bed without any needs or questions at this time.

## 2024-04-01 NOTE — ED NOTES
Upon discharge, patient is AAOx4, no cardiac or respiratory complications. Follow up care and  Medications have been reviewed with patient and has been instructed to return to the ER if needed. Patient verbalized understanding and ambulated to the lobby without difficulty. ISACC BRICE.

## 2024-04-01 NOTE — ED NOTES
Ice pack has been applied to posterior neck. SOLOMON Camarena is aware that patient needs something for pain.
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
## 2024-04-01 NOTE — PROGRESS NOTES
37 yo BF [id number] presents for eval bilateral breast tenderness for several months and in addition, eval for intended future pregnancy Pt with Nexplanon in place with expirartion . Currently mild spotting UPT neg Pt also under management for DM with last HA1C 6.4  Two previous spont conceptions, vaginal deliveries  Negative family history breast Ca and nonsmoker  Past Medical History:   Diagnosis Date    Anemia     Asthma     since younger    Herpes simplex without mention of complication     Obese        Past Surgical History:   Procedure Laterality Date    EYE SURGERY Bilateral 2016    Lasix       Family History   Problem Relation Age of Onset    Hypertension Mother     Diabetes Mother     Stroke Sister     Breast cancer Maternal Aunt     Cancer Maternal Aunt     Diabetes Maternal Aunt     Colon cancer Neg Hx     Ovarian cancer Neg Hx        Social History     Socioeconomic History    Marital status: Single   Tobacco Use    Smoking status: Never    Smokeless tobacco: Never   Substance and Sexual Activity    Alcohol use: Yes     Comment: Seldomly    Drug use: Not Currently     Types: Marijuana     Comment: seldom    Sexual activity: Yes     Partners: Male     Birth control/protection: None, Implant     Comment: nexplanon       Current Outpatient Medications   Medication Sig Dispense Refill    doxycycline (VIBRAMYCIN) 100 MG Cap Take 1 capsule (100 mg total) by mouth 2 (two) times a day. 20 capsule 0    ibuprofen (ADVIL,MOTRIN) 600 MG tablet Take 1 tablet (600 mg total) by mouth every 8 (eight) hours as needed for Pain. 20 tablet 0    metFORMIN (GLUCOPHAGE-XR) 500 MG ER 24hr tablet Take 1 tablet (500 mg total) by mouth 2 (two) times daily with meals. (Patient not taking: Reported on 2024) 180 tablet 1     Current Facility-Administered Medications   Medication Dose Route Frequency Provider Last Rate Last Admin    etonogestreL subdermal device 68 mg  68 mg Implant  Michelle Meyer MD   68 mg at 21 [id number]        Review of patient's allergies indicates:  No Known Allergies    Review of System:   General: no chills, fever, night sweats, weight gain or weight loss  Psychological: no depression or suicidal ideation  Breasts: no new or changing breast lumps, nipple discharge or masses.  Respiratory: no cough, shortness of breath, or wheezing  Cardiovascular: no chest pain or dyspnea on exertion  Gastrointestinal: no abdominal pain, change in bowel habits, or black or bloody stools  Genito-Urinary: no incontinence, urinary frequency/urgency or vulvar/vaginal symptoms, pelvic pain or abnormal vaginal bleeding.  Musculoskeletal: no gait disturbance or muscular weakness     Breasts  symmetrical No adenopathy, skin changes, erythema retractions or d/c    I discussed likely glandulartissue and fibrocystic changes  and rec NSAIDS  Discussed fertility status and rec optimizing her glycemic control prior to attempting to concieve  Will remove Nexplanon in Dec and follow prn    I spent a total of 30 minutes on the day of the visit. This includes face to face time and non-face to face time preparing to see the patient (eg, review of tests), obtaining and/or reviewing separately obtained history, documenting clinical information in the electronic or other health record, independently interpreting results and communicating results to the patient/family/caregiver, or care coordinator.

## 2024-04-02 LAB
ESTIMATED AVG GLUCOSE: 140 MG/DL (ref 68–131)
HBA1C MFR BLD: 6.5 % (ref 4.5–6.2)

## 2024-04-04 ENCOUNTER — OFFICE VISIT (OUTPATIENT)
Dept: FAMILY MEDICINE | Facility: CLINIC | Age: 39
End: 2024-04-04
Payer: COMMERCIAL

## 2024-04-04 VITALS
RESPIRATION RATE: 18 BRPM | OXYGEN SATURATION: 98 % | TEMPERATURE: 98 F | SYSTOLIC BLOOD PRESSURE: 136 MMHG | WEIGHT: 261.13 LBS | BODY MASS INDEX: 46.27 KG/M2 | DIASTOLIC BLOOD PRESSURE: 76 MMHG | HEART RATE: 86 BPM | HEIGHT: 63 IN

## 2024-04-04 DIAGNOSIS — E78.5 HYPERLIPIDEMIA, UNSPECIFIED HYPERLIPIDEMIA TYPE: Primary | ICD-10-CM

## 2024-04-04 DIAGNOSIS — E11.9 TYPE 2 DIABETES MELLITUS WITHOUT COMPLICATION, WITHOUT LONG-TERM CURRENT USE OF INSULIN: ICD-10-CM

## 2024-04-04 DIAGNOSIS — E66.01 MORBID OBESITY: ICD-10-CM

## 2024-04-04 PROCEDURE — 99999 PR PBB SHADOW E&M-EST. PATIENT-LVL V: CPT | Mod: PBBFAC,,, | Performed by: FAMILY MEDICINE

## 2024-04-04 PROCEDURE — 3078F DIAST BP <80 MM HG: CPT | Mod: CPTII,S$GLB,, | Performed by: FAMILY MEDICINE

## 2024-04-04 PROCEDURE — 1160F RVW MEDS BY RX/DR IN RCRD: CPT | Mod: CPTII,S$GLB,, | Performed by: FAMILY MEDICINE

## 2024-04-04 PROCEDURE — 1159F MED LIST DOCD IN RCRD: CPT | Mod: CPTII,S$GLB,, | Performed by: FAMILY MEDICINE

## 2024-04-04 PROCEDURE — 3075F SYST BP GE 130 - 139MM HG: CPT | Mod: CPTII,S$GLB,, | Performed by: FAMILY MEDICINE

## 2024-04-04 PROCEDURE — 3044F HG A1C LEVEL LT 7.0%: CPT | Mod: CPTII,S$GLB,, | Performed by: FAMILY MEDICINE

## 2024-04-04 PROCEDURE — 99214 OFFICE O/P EST MOD 30 MIN: CPT | Mod: S$GLB,,, | Performed by: FAMILY MEDICINE

## 2024-04-04 PROCEDURE — 3008F BODY MASS INDEX DOCD: CPT | Mod: CPTII,S$GLB,, | Performed by: FAMILY MEDICINE

## 2024-04-04 RX ORDER — PRAVASTATIN SODIUM 40 MG/1
40 TABLET ORAL DAILY
Qty: 90 TABLET | Refills: 1 | Status: SHIPPED | OUTPATIENT
Start: 2024-04-04

## 2024-04-04 RX ORDER — PRAVASTATIN SODIUM 40 MG/1
40 TABLET ORAL DAILY
COMMUNITY
End: 2024-04-04 | Stop reason: SDUPTHER

## 2024-04-04 RX ORDER — TIRZEPATIDE 2.5 MG/.5ML
2.5 INJECTION, SOLUTION SUBCUTANEOUS
COMMUNITY
End: 2024-04-04 | Stop reason: SDUPTHER

## 2024-04-04 RX ORDER — METFORMIN HYDROCHLORIDE 500 MG/1
500 TABLET, EXTENDED RELEASE ORAL 2 TIMES DAILY WITH MEALS
Qty: 180 TABLET | Refills: 1 | Status: SHIPPED | OUTPATIENT
Start: 2024-04-04

## 2024-04-04 RX ORDER — TIRZEPATIDE 2.5 MG/.5ML
2.5 INJECTION, SOLUTION SUBCUTANEOUS
Qty: 4 PEN | Refills: 0 | Status: SHIPPED | OUTPATIENT
Start: 2024-04-04 | End: 2024-05-01

## 2024-04-04 NOTE — PATIENT INSTRUCTIONS
Decreased metformin 500 mg daily.    dietician  Gia Hoang, RD  [address] [place] [address]  Phone: [phone]     Fax: [phone]

## 2024-04-05 PROBLEM — E11.9 TYPE 2 DIABETES MELLITUS WITHOUT COMPLICATION, WITHOUT LONG-TERM CURRENT USE OF INSULIN: Status: ACTIVE | Noted: 2024-04-05

## 2024-04-05 PROBLEM — E78.5 HYPERLIPIDEMIA: Status: ACTIVE | Noted: 2024-04-05

## 2024-04-05 PROBLEM — E66.01 MORBID OBESITY: Status: ACTIVE | Noted: 2024-04-05

## 2024-04-06 NOTE — PROGRESS NOTES
Subjective:       Patient ID: Sotero Pelaez is a 38 y.o. female.    Chief Complaint: Follow-up (3 month)    Morbid obesity.  BMI of 46 down 1 lb.  Was Prediabetic.  Metformin A1c 6.5.  Tolerating 1 pill per day.  UPT negative.  Misses the metformin regularly.  Meets criteria for diabetes mellitus type 2 now.  Hyperlipidemia 182 cholesterol HDL 49 .  No history of pancreatitis.  No family history of medullary carcinoma of the thyroid.      Physical examination.  Vital signs noted.  Neck without bruit.  Chest clear.  Heart regular rate and rhythm.  Extremities 2+ pedal pulses.  No calluses skin breakdown or ulcerations.  Light touch intact 5 of 5 spots tested each foot.      Objective:        Assessment:       1. Hyperlipidemia, unspecified hyperlipidemia type    2. BMI 45.0-49.9, adult    3. Morbid obesity    4. Type 2 diabetes mellitus without complication, without long-term current use of insulin        Plan:       Hyperlipidemia, unspecified hyperlipidemia type    BMI 45.0-49.9, adult    Morbid obesity    Type 2 diabetes mellitus without complication, without long-term current use of insulin  -     Cancel: Ambulatory referral/consult to Nutrition Services; Future; Expected date: 04/11/2024  -     tirzepatide (MOUNJARO) 2.5 mg/0.5 mL PnIj; Inject 2.5 mg into the skin every 7 days.  Dispense: 4 Pen; Refill: 0  -     Ambulatory referral/consult to Nutrition Services; Future; Expected date: 04/11/2024    Other orders  -     metFORMIN (GLUCOPHAGE-XR) 500 MG ER 24hr tablet; Take 1 tablet (500 mg total) by mouth 2 (two) times daily with meals.  Dispense: 180 tablet; Refill: 1  -     pravastatin (PRAVACHOL) 40 MG tablet; Take 1 tablet (40 mg total) by mouth once daily.  Dispense: 90 tablet; Refill: 1    Needs to go for diabetic eye exam.  Pravachol 40 mg daily 90 with a refill.  Continue metformin  per day.  Start Mounjaro 2.5 weekly 4 pens.  Follow-up with nurse practitioner in one-month to increase the  Mounjaro dose.  To dietitian regarding diabetic diet.

## 2024-04-25 ENCOUNTER — NUTRITION (OUTPATIENT)
Dept: NUTRITION | Facility: HOSPITAL | Age: 39
End: 2024-04-25
Payer: COMMERCIAL

## 2024-04-25 DIAGNOSIS — E11.9 TYPE 2 DIABETES MELLITUS WITHOUT COMPLICATION, WITHOUT LONG-TERM CURRENT USE OF INSULIN: ICD-10-CM

## 2024-04-25 DIAGNOSIS — Z71.3: Primary | ICD-10-CM

## 2024-04-25 PROCEDURE — 97802 MEDICAL NUTRITION INDIV IN: CPT
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
## 2024-05-01 ENCOUNTER — OFFICE VISIT (OUTPATIENT)
Dept: FAMILY MEDICINE | Facility: CLINIC | Age: 39
End: 2024-05-01
Payer: COMMERCIAL

## 2024-05-01 VITALS
RESPIRATION RATE: 18 BRPM | WEIGHT: 254.63 LBS | OXYGEN SATURATION: 98 % | HEIGHT: 63 IN | SYSTOLIC BLOOD PRESSURE: 118 MMHG | TEMPERATURE: 98 F | DIASTOLIC BLOOD PRESSURE: 76 MMHG | BODY MASS INDEX: 45.12 KG/M2 | HEART RATE: 77 BPM

## 2024-05-01 DIAGNOSIS — E11.9 TYPE 2 DIABETES MELLITUS WITHOUT COMPLICATION, UNSPECIFIED WHETHER LONG TERM INSULIN USE: ICD-10-CM

## 2024-05-01 DIAGNOSIS — E11.9 TYPE 2 DIABETES MELLITUS WITHOUT COMPLICATION, WITHOUT LONG-TERM CURRENT USE OF INSULIN: Primary | ICD-10-CM

## 2024-05-01 DIAGNOSIS — E11.9 TYPE 2 DIABETES MELLITUS WITHOUT COMPLICATION: ICD-10-CM

## 2024-05-01 PROCEDURE — 99999 PR PBB SHADOW E&M-EST. PATIENT-LVL IV: CPT | Mod: PBBFAC,,, | Performed by: NURSE PRACTITIONER

## 2024-05-01 PROCEDURE — 3044F HG A1C LEVEL LT 7.0%: CPT | Mod: CPTII,S$GLB,, | Performed by: NURSE PRACTITIONER

## 2024-05-01 PROCEDURE — 3008F BODY MASS INDEX DOCD: CPT | Mod: CPTII,S$GLB,, | Performed by: NURSE PRACTITIONER

## 2024-05-01 PROCEDURE — 99213 OFFICE O/P EST LOW 20 MIN: CPT | Mod: S$GLB,,, | Performed by: NURSE PRACTITIONER

## 2024-05-01 PROCEDURE — 3078F DIAST BP <80 MM HG: CPT | Mod: CPTII,S$GLB,, | Performed by: NURSE PRACTITIONER

## 2024-05-01 PROCEDURE — 3074F SYST BP LT 130 MM HG: CPT | Mod: CPTII,S$GLB,, | Performed by: NURSE PRACTITIONER

## 2024-05-01 PROCEDURE — 1159F MED LIST DOCD IN RCRD: CPT | Mod: CPTII,S$GLB,, | Performed by: NURSE PRACTITIONER

## 2024-05-01 PROCEDURE — 1160F RVW MEDS BY RX/DR IN RCRD: CPT | Mod: CPTII,S$GLB,, | Performed by: NURSE PRACTITIONER

## 2024-05-01 RX ORDER — TIRZEPATIDE 5 MG/.5ML
5 INJECTION, SOLUTION SUBCUTANEOUS
Qty: 12 PEN | Refills: 1 | Status: SHIPPED | OUTPATIENT
Start: 2024-05-01

## 2024-05-01 NOTE — PROGRESS NOTES
SUBJECTIVE:      Patient ID: Sotero Pelaez is a 38 y.o. female.    Chief Complaint: Follow-up (1 month)    HPI  Is here for mounjaro refill  Has lost over 10 pounds but A1c went from 6.4 to 6.5  Denies chest pain  Denies sob  Denies fever  Denies chills  Denies side effects  Has met with nutritionist    Is on 2.5 mg mounjaro   Will increase to 5 mg mounjaro weekly    Past Surgical History:   Procedure Laterality Date    EYE SURGERY Bilateral 2016    Lasix     Family History   Problem Relation Name Age of Onset    Hypertension Mother      Diabetes Mother      Stroke Sister      Breast cancer Maternal Aunt Beatriz     Cancer Maternal Aunt Beatriz     Diabetes Maternal Aunt Beatriz     Colon cancer Neg Hx      Ovarian cancer Neg Hx        Social History     Socioeconomic History    Marital status: Single   Tobacco Use    Smoking status: Never    Smokeless tobacco: Never   Substance and Sexual Activity    Alcohol use: Yes     Comment: Seldomly    Drug use: Not Currently     Types: Marijuana     Comment: seldom    Sexual activity: Yes     Partners: Male     Birth control/protection: None, Implant     Comment: nexplanon     Current Outpatient Medications   Medication Sig Dispense Refill    metFORMIN (GLUCOPHAGE-XR) 500 MG ER 24hr tablet Take 1 tablet (500 mg total) by mouth 2 (two) times daily with meals. 180 tablet 1    pravastatin (PRAVACHOL) 40 MG tablet Take 1 tablet (40 mg total) by mouth once daily. 90 tablet 1    tirzepatide (MOUNJARO) 5 mg/0.5 mL PnIj Inject 5 mg into the skin every 7 days. 12 Pen 1     Current Facility-Administered Medications   Medication Dose Route Frequency Provider Last Rate Last Admin    etonogestreL subdermal device 68 mg  68 mg Implant  Michelle Meyer MD   68 mg at 12/08/21 0920     Review of patient's allergies indicates:  No Known Allergies   Past Medical History:   Diagnosis Date    Anemia     Asthma     since younger    Herpes simplex without mention  Orthopedic Progress Note    Patient:  Jamilah Jeffers  YOB: 1962     56 07/07/20  1932  07/08/20  1832 07/08/20  2338 07/09/of complication     Obese      Past Surgical History:   Procedure Laterality Date    EYE SURGERY Bilateral 2016    Lasix       Review of Systems   All other systems reviewed and are negative.   OBJECTIVE:      Vitals:    05/01/24 1214   BP: 118/76   BP Location: Left arm   Patient Position: Sitting   BP Method: Large (Manual)   Pulse: 77   Resp: 18   Temp: 97.9 °F (36.6 °C)   SpO2: 98%   Weight: 115.5 kg (254 lb 10.1 oz)   Height: 5' 3" (1.6 m)     Physical Exam  Vitals and nursing note reviewed.   Constitutional:       Appearance: Normal appearance. She is obese.   HENT:      Head: Normocephalic and atraumatic.   Eyes:      Pupils: Pupils are equal, round, and reactive to light.   Cardiovascular:      Rate and Rhythm: Normal rate and regular rhythm.      Pulses: Normal pulses.      Heart sounds: Normal heart sounds.   Pulmonary:      Effort: Pulmonary effort is normal.      Breath sounds: Normal breath sounds.   Musculoskeletal:      Cervical back: Normal range of motion.   Skin:     General: Skin is warm and dry.   Neurological:      General: No focal deficit present.      Mental Status: She is alert and oriented to person, place, and time. Mental status is at baseline.   Psychiatric:         Mood and Affect: Mood normal.         Behavior: Behavior normal.         Thought Content: Thought content normal.         Judgment: Judgment normal.      Comments: nonsuicidal      Assessment:       1. Type 2 diabetes mellitus without complication, without long-term current use of insulin        Plan:       Type 2 diabetes mellitus without complication, without long-term current use of insulin  -     tirzepatide (MOUNJARO) 5 mg/0.5 mL PnIj; Inject 5 mg into the skin every 7 days.  Dispense: 12 Pen; Refill: 1  -     Hemoglobin A1C; Future; Expected date: 05/15/2024    Increase mounjaro to 5 mg  Continue metformin  Take vit b daily  Get A1c in 3 months  Follow up in 3 months or sooner if needed    No follow-ups on file.    " [address]   5/1/2024 WILLIAM Bajwa, FNP

## 2024-05-02 ENCOUNTER — PATIENT MESSAGE (OUTPATIENT)
Dept: ADMINISTRATIVE | Facility: HOSPITAL | Age: 39
End: 2024-05-02
Payer: COMMERCIAL

## 2024-05-02 ENCOUNTER — PATIENT OUTREACH (OUTPATIENT)
Dept: ADMINISTRATIVE | Facility: HOSPITAL | Age: 39
End: 2024-05-02
Payer: COMMERCIAL

## 2024-05-02 NOTE — PROGRESS NOTES
Population Health Chart Review & Patient Outreach Details      Additional Pop Health Notes:               Updates Requested / Reviewed:      Updated Care Coordination Note         Health Maintenance Topics Overdue:      VB Score: 3     Urine Screening  Eye Exam  Foot Exam                       Health Maintenance Topic(s) Outreach Outcomes & Actions Taken:    Eye Exam - Outreach Outcomes & Actions Taken  : msg    Lab(s) - Outreach Outcomes & Actions Taken  : Overdue Lab(s) Ordered

## 2024-09-04 ENCOUNTER — OFFICE VISIT (OUTPATIENT)
Dept: FAMILY MEDICINE | Facility: CLINIC | Age: 39
End: 2024-09-04
Payer: COMMERCIAL

## 2024-09-04 VITALS
OXYGEN SATURATION: 99 % | HEART RATE: 70 BPM | HEIGHT: 63 IN | SYSTOLIC BLOOD PRESSURE: 118 MMHG | DIASTOLIC BLOOD PRESSURE: 80 MMHG | TEMPERATURE: 98 F | RESPIRATION RATE: 18 BRPM | BODY MASS INDEX: 42.86 KG/M2 | WEIGHT: 241.88 LBS

## 2024-09-04 DIAGNOSIS — E11.9 TYPE 2 DIABETES MELLITUS WITHOUT COMPLICATION, WITHOUT LONG-TERM CURRENT USE OF INSULIN: ICD-10-CM

## 2024-09-04 PROBLEM — R73.03 PREDIABETES: Status: RESOLVED | Noted: 2023-12-30 | Resolved: 2024-09-04

## 2024-09-04 PROCEDURE — 1159F MED LIST DOCD IN RCRD: CPT | Mod: CPTII,S$GLB,, | Performed by: NURSE PRACTITIONER

## 2024-09-04 PROCEDURE — 90677 PCV20 VACCINE IM: CPT | Mod: S$GLB,,, | Performed by: NURSE PRACTITIONER

## 2024-09-04 PROCEDURE — 1160F RVW MEDS BY RX/DR IN RCRD: CPT | Mod: CPTII,S$GLB,, | Performed by: NURSE PRACTITIONER

## 2024-09-04 PROCEDURE — 90471 IMMUNIZATION ADMIN: CPT | Mod: S$GLB,,, | Performed by: NURSE PRACTITIONER

## 2024-09-04 PROCEDURE — 3079F DIAST BP 80-89 MM HG: CPT | Mod: CPTII,S$GLB,, | Performed by: NURSE PRACTITIONER

## 2024-09-04 PROCEDURE — 99213 OFFICE O/P EST LOW 20 MIN: CPT | Mod: 25,S$GLB,, | Performed by: NURSE PRACTITIONER

## 2024-09-04 PROCEDURE — 3044F HG A1C LEVEL LT 7.0%: CPT | Mod: CPTII,S$GLB,, | Performed by: NURSE PRACTITIONER

## 2024-09-04 PROCEDURE — 3008F BODY MASS INDEX DOCD: CPT | Mod: CPTII,S$GLB,, | Performed by: NURSE PRACTITIONER

## 2024-09-04 PROCEDURE — 3074F SYST BP LT 130 MM HG: CPT | Mod: CPTII,S$GLB,, | Performed by: NURSE PRACTITIONER

## 2024-09-04 PROCEDURE — 99999 PR PBB SHADOW E&M-EST. PATIENT-LVL III: CPT | Mod: PBBFAC,,, | Performed by: NURSE PRACTITIONER

## 2024-09-04 RX ORDER — TIRZEPATIDE 5 MG/.5ML
5 INJECTION, SOLUTION SUBCUTANEOUS
Qty: 12 PEN | Refills: 1 | Status: SHIPPED | OUTPATIENT
Start: 2024-09-04

## 2024-09-04 NOTE — PROGRESS NOTES
SUBJECTIVE:      Patient ID: Sotero Pelaez is a 39 y.o. female.    Chief Complaint: Follow-up (3 month)    HPI  Is here for follow up on mounjaro 5.0 mg  A1c not done- will get done at [facility] in early dec  Still has a decrease in laura and has lost 13 pounds since may  Foot exam is due  Pneumonia vacc is also due  So is flu - but we do not have in clinic at this time    Vss; bp is well controlled  Denies chest pain  Denies sob  Denies fever  Denies chills      Past Surgical History:   Procedure Laterality Date    EYE SURGERY Bilateral 2016    Lasix     Family History   Problem Relation Name Age of Onset    Hypertension Mother      Diabetes Mother      Stroke Sister      Breast cancer Maternal Aunt Beatriz     Cancer Maternal Aunt Beatriz     Diabetes Maternal Aunt Beatriz     Colon cancer Neg Hx      Ovarian cancer Neg Hx        Social History     Socioeconomic History    Marital status: Single   Tobacco Use    Smoking status: Never    Smokeless tobacco: Never   Substance and Sexual Activity    Alcohol use: Yes     Comment: Seldomly    Drug use: Not Currently     Types: Marijuana     Comment: seldom    Sexual activity: Yes     Partners: Male     Birth control/protection: None, Implant     Comment: nexplanon     Current Outpatient Medications   Medication Sig Dispense Refill    metFORMIN (GLUCOPHAGE-XR) 500 MG ER 24hr tablet Take 1 tablet (500 mg total) by mouth 2 (two) times daily with meals. 180 tablet 1    pravastatin (PRAVACHOL) 40 MG tablet Take 1 tablet (40 mg total) by mouth once daily. 90 tablet 1    tirzepatide (MOUNJARO) 5 mg/0.5 mL PnIj Inject 5 mg into the skin every 7 days. 12 Pen 1     Current Facility-Administered Medications   Medication Dose Route Frequency Provider Last Rate Last Admin    etonogestreL subdermal device 68 mg  68 mg Implant  Michelle Meyer MD   68 mg at 12/08/21 0920     Review of patient's allergies indicates:  No Known Allergies   Past Medical History:   Diagnosis Date     "Anemia     Asthma     since younger    Herpes simplex without mention of complication     Obese      Past Surgical History:   Procedure Laterality Date    EYE SURGERY Bilateral 2016    Lasix       Review of Systems   All other systems reviewed and are negative.     OBJECTIVE:      Vitals:    09/04/24 0843   BP: 118/80   BP Location: Left arm   Patient Position: Sitting   BP Method: Large (Manual)   Pulse: 70   Resp: 18   Temp: 97.5 °F (36.4 °C)   SpO2: 99%   Weight: 109.7 kg (241 lb 13.5 oz)   Height: 5' 3" (1.6 m)     Physical Exam  Vitals and nursing note reviewed.   Constitutional:       Appearance: Normal appearance. She is obese.   HENT:      Head: Normocephalic and atraumatic.   Eyes:      Pupils: Pupils are equal, round, and reactive to light.   Cardiovascular:      Rate and Rhythm: Normal rate.      Pulses: Normal pulses.      Heart sounds: Normal heart sounds.   Pulmonary:      Effort: Pulmonary effort is normal.      Breath sounds: Normal breath sounds.   Musculoskeletal:      Cervical back: Normal range of motion.   Skin:     General: Skin is warm and dry.   Neurological:      General: No focal deficit present.      Mental Status: She is alert. Mental status is at baseline.      Comments: Foot exam completed: 5/5 sensation with 2+ pedal pulses with less than 2 sec crt and c/d/I   Psychiatric:         Mood and Affect: Mood normal.         Behavior: Behavior normal.         Thought Content: Thought content normal.         Judgment: Judgment normal.      Comments: nonsuicidal        Assessment:       1. Type 2 diabetes mellitus without complication, without long-term current use of insulin        Plan:       Type 2 diabetes mellitus without complication, without long-term current use of insulin  -     tirzepatide (MOUNJARO) 5 mg/0.5 mL PnIj; Inject 5 mg into the skin every 7 days.  Dispense: 12 Pen; Refill: 1  -     HM DIABETES FOOT EXAM    Foot exam completed: 5/5 sensation with 2+ pedal pulses with less " than 2 sec crt and c/d/I  Continue mounjaro 5.0 mg sq q week  Get lab done in early dec  Follow up in 3 months or sooner if needed    Follow up in about 3 months (around 12/4/2024), or if symptoms worsen or fail to improve.      9/4/2024 WILLIAM Bajwa, FNP

## 2024-09-19 ENCOUNTER — PATIENT MESSAGE (OUTPATIENT)
Dept: ADMINISTRATIVE | Facility: HOSPITAL | Age: 39
End: 2024-09-19
Payer: COMMERCIAL

## 2024-11-26 ENCOUNTER — LAB VISIT (OUTPATIENT)
Dept: LAB | Facility: HOSPITAL | Age: 39
End: 2024-11-26
Attending: NURSE PRACTITIONER
Payer: COMMERCIAL

## 2024-11-26 DIAGNOSIS — E11.9 TYPE 2 DIABETES MELLITUS WITHOUT COMPLICATION: ICD-10-CM

## 2024-11-26 DIAGNOSIS — E11.9 TYPE 2 DIABETES MELLITUS WITHOUT COMPLICATION, WITHOUT LONG-TERM CURRENT USE OF INSULIN: ICD-10-CM

## 2024-11-26 LAB
ALBUMIN/CREAT UR: 7.6 UG/MG (ref 0–30)
CREAT UR-MCNC: 186 MG/DL (ref 15–325)
ESTIMATED AVG GLUCOSE: 108 MG/DL (ref 68–131)
HBA1C MFR BLD: 5.4 % (ref 4.5–6.2)
MICROALBUMIN UR DL<=1MG/L-MCNC: 14.2 UG/ML

## 2024-11-26 PROCEDURE — 82043 UR ALBUMIN QUANTITATIVE: CPT | Performed by: FAMILY MEDICINE

## 2024-11-26 PROCEDURE — 83036 HEMOGLOBIN GLYCOSYLATED A1C: CPT | Performed by: NURSE PRACTITIONER

## 2024-11-26 PROCEDURE — 36415 COLL VENOUS BLD VENIPUNCTURE: CPT | Performed by: NURSE PRACTITIONER

## 2024-11-27 ENCOUNTER — PATIENT MESSAGE (OUTPATIENT)
Dept: FAMILY MEDICINE | Facility: CLINIC | Age: 39
End: 2024-11-27
Payer: COMMERCIAL

## 2024-12-18 ENCOUNTER — OFFICE VISIT (OUTPATIENT)
Dept: FAMILY MEDICINE | Facility: CLINIC | Age: 39
End: 2024-12-18
Payer: COMMERCIAL

## 2024-12-18 VITALS
OXYGEN SATURATION: 98 % | TEMPERATURE: 98 F | DIASTOLIC BLOOD PRESSURE: 80 MMHG | HEIGHT: 63 IN | RESPIRATION RATE: 18 BRPM | SYSTOLIC BLOOD PRESSURE: 112 MMHG | BODY MASS INDEX: 42.93 KG/M2 | HEART RATE: 78 BPM | WEIGHT: 242.31 LBS

## 2024-12-18 DIAGNOSIS — E78.5 HYPERLIPIDEMIA, UNSPECIFIED HYPERLIPIDEMIA TYPE: Primary | ICD-10-CM

## 2024-12-18 DIAGNOSIS — E11.9 TYPE 2 DIABETES MELLITUS WITHOUT COMPLICATION, WITHOUT LONG-TERM CURRENT USE OF INSULIN: ICD-10-CM

## 2024-12-18 PROCEDURE — 99214 OFFICE O/P EST MOD 30 MIN: CPT | Mod: S$GLB,,, | Performed by: NURSE PRACTITIONER

## 2024-12-18 PROCEDURE — 3061F NEG MICROALBUMINURIA REV: CPT | Mod: CPTII,S$GLB,, | Performed by: NURSE PRACTITIONER

## 2024-12-18 PROCEDURE — 3066F NEPHROPATHY DOC TX: CPT | Mod: CPTII,S$GLB,, | Performed by: NURSE PRACTITIONER

## 2024-12-18 PROCEDURE — 3079F DIAST BP 80-89 MM HG: CPT | Mod: CPTII,S$GLB,, | Performed by: NURSE PRACTITIONER

## 2024-12-18 PROCEDURE — 3074F SYST BP LT 130 MM HG: CPT | Mod: CPTII,S$GLB,, | Performed by: NURSE PRACTITIONER

## 2024-12-18 PROCEDURE — 99999 PR PBB SHADOW E&M-EST. PATIENT-LVL III: CPT | Mod: PBBFAC,,, | Performed by: NURSE PRACTITIONER

## 2024-12-18 PROCEDURE — 1160F RVW MEDS BY RX/DR IN RCRD: CPT | Mod: CPTII,S$GLB,, | Performed by: NURSE PRACTITIONER

## 2024-12-18 PROCEDURE — 3008F BODY MASS INDEX DOCD: CPT | Mod: CPTII,S$GLB,, | Performed by: NURSE PRACTITIONER

## 2024-12-18 PROCEDURE — 1159F MED LIST DOCD IN RCRD: CPT | Mod: CPTII,S$GLB,, | Performed by: NURSE PRACTITIONER

## 2024-12-18 PROCEDURE — 3044F HG A1C LEVEL LT 7.0%: CPT | Mod: CPTII,S$GLB,, | Performed by: NURSE PRACTITIONER

## 2024-12-18 RX ORDER — TIRZEPATIDE 5 MG/.5ML
5 INJECTION, SOLUTION SUBCUTANEOUS
Qty: 12 PEN | Refills: 1 | Status: CANCELLED | OUTPATIENT
Start: 2024-12-18

## 2024-12-18 RX ORDER — PRAVASTATIN SODIUM 40 MG/1
40 TABLET ORAL NIGHTLY
Qty: 90 TABLET | Refills: 1 | Status: SHIPPED | OUTPATIENT
Start: 2024-12-18

## 2024-12-18 NOTE — PROGRESS NOTES
SUBJECTIVE:      Patient ID: Sotero Pelaez is a 39 y.o. female.    Chief Complaint: Follow-up (3 month)    HPI  Vss; bp is well controlled  Denies chest pain  Denies sob  Denies fever  Denies chills    Weight is the same as in September  A1c is 5.4  No side effects on the 7.5 mg mounjaro  Stopped her pravastatin- recommend to restart pravastatin  Went over cardiac event risk    Will get fasting labs in 3 months as well    Does not want flu shot today  Covid vaccines are at pharm is wants    Past Surgical History:   Procedure Laterality Date    EYE SURGERY Bilateral 2016    Lasix     Family History   Problem Relation Name Age of Onset    Hypertension Mother      Diabetes Mother      Stroke Sister      Breast cancer Maternal Aunt Beatriz     Cancer Maternal Aunt Beatriz     Diabetes Maternal Aunt Beatriz     Colon cancer Neg Hx      Ovarian cancer Neg Hx        Social History     Socioeconomic History    Marital status: Single   Tobacco Use    Smoking status: Never    Smokeless tobacco: Never   Substance and Sexual Activity    Alcohol use: Yes     Comment: Seldomly    Drug use: Not Currently     Types: Marijuana     Comment: seldom    Sexual activity: Yes     Partners: Male     Birth control/protection: None, Implant     Comment: nexplanon     Current Outpatient Medications   Medication Sig Dispense Refill    pravastatin (PRAVACHOL) 40 MG tablet Take 1 tablet (40 mg total) by mouth every evening. 90 tablet 1    tirzepatide 7.5 mg/0.5 mL PnIj Inject 7.5 mg into the skin every 7 days. 6 mL 1     Current Facility-Administered Medications   Medication Dose Route Frequency Provider Last Rate Last Admin    etonogestreL subdermal device 68 mg  68 mg Implant  Michelle Meyer MD   68 mg at 12/08/21 0920     Review of patient's allergies indicates:  No Known Allergies   Past Medical History:   Diagnosis Date    Anemia     Asthma     since younger    Herpes simplex without mention of complication     Obese      Past  "Surgical History:   Procedure Laterality Date    EYE SURGERY Bilateral 2016    Lasix       Review of Systems   All other systems reviewed and are negative.     OBJECTIVE:      Vitals:    12/18/24 0825   BP: 112/80   BP Location: Left arm   Patient Position: Sitting   Pulse: 78   Resp: 18   Temp: 97.7 °F (36.5 °C)   SpO2: 98%   Weight: 109.9 kg (242 lb 4.6 oz)   Height: 5' 3" (1.6 m)     Physical Exam  Vitals and nursing note reviewed.   Constitutional:       Appearance: Normal appearance. She is obese.   HENT:      Head: Normocephalic and atraumatic.   Eyes:      Pupils: Pupils are equal, round, and reactive to light.   Cardiovascular:      Rate and Rhythm: Normal rate and regular rhythm.      Pulses: Normal pulses.   Pulmonary:      Effort: Pulmonary effort is normal.      Breath sounds: Normal breath sounds.   Musculoskeletal:      Cervical back: Normal range of motion.   Skin:     General: Skin is warm and dry.   Neurological:      General: No focal deficit present.      Mental Status: She is alert and oriented to person, place, and time. Mental status is at baseline.   Psychiatric:         Mood and Affect: Mood normal.         Behavior: Behavior normal.         Thought Content: Thought content normal.         Judgment: Judgment normal.      Comments: nonsuicidal        Assessment:       1. Hyperlipidemia, unspecified hyperlipidemia type    2. Type 2 diabetes mellitus without complication, without long-term current use of insulin        Plan:       Hyperlipidemia, unspecified hyperlipidemia type  -     pravastatin (PRAVACHOL) 40 MG tablet; Take 1 tablet (40 mg total) by mouth every evening.  Dispense: 90 tablet; Refill: 1  -     Lipid Panel; Future; Expected date: 12/18/2024  -     Comprehensive Metabolic Panel; Future; Expected date: 12/18/2024    Type 2 diabetes mellitus without complication, without long-term current use of insulin  -     tirzepatide 7.5 mg/0.5 mL PnIj; Inject 7.5 mg into the skin every 7 " days.  Dispense: 6 mL; Refill: 1  -     Hemoglobin A1C; Future; Expected date: 12/18/2024  -     Comprehensive Metabolic Panel; Future; Expected date: 12/18/2024    Take medications as ordered  Get fasting labs at [facility] in 3 months   Follow up in 3 months or sooner if needed      No follow-ups on file.      12/18/2024 WILLIAM Bajwa, FNP

## 2024-12-26 ENCOUNTER — OFFICE VISIT (OUTPATIENT)
Dept: URGENT CARE | Facility: CLINIC | Age: 39
End: 2024-12-26
Payer: COMMERCIAL

## 2024-12-26 VITALS
HEIGHT: 63 IN | TEMPERATURE: 100 F | DIASTOLIC BLOOD PRESSURE: 87 MMHG | RESPIRATION RATE: 16 BRPM | WEIGHT: 242 LBS | OXYGEN SATURATION: 98 % | HEART RATE: 96 BPM | BODY MASS INDEX: 42.88 KG/M2 | SYSTOLIC BLOOD PRESSURE: 140 MMHG

## 2024-12-26 DIAGNOSIS — J03.90 EXUDATIVE TONSILLITIS: Primary | ICD-10-CM

## 2024-12-26 DIAGNOSIS — R89.4 INFLUENZA A VIRUS NOT DETECTED: ICD-10-CM

## 2024-12-26 DIAGNOSIS — Z20.822 COVID-19 VIRUS NOT DETECTED: ICD-10-CM

## 2024-12-26 DIAGNOSIS — J02.9 SORE THROAT: ICD-10-CM

## 2024-12-26 LAB
CTP QC/QA: YES
FLUAV AG NPH QL: NEGATIVE
FLUBV AG NPH QL: NEGATIVE
S PYO RRNA THROAT QL PROBE: NEGATIVE
SARS-COV-2 AG RESP QL IA.RAPID: NEGATIVE

## 2024-12-26 RX ORDER — AMOXICILLIN 500 MG/1
500 TABLET, FILM COATED ORAL EVERY 12 HOURS
Qty: 20 TABLET | Refills: 0 | Status: SHIPPED | OUTPATIENT
Start: 2024-12-26 | End: 2025-01-05

## 2024-12-26 NOTE — PATIENT INSTRUCTIONS
Try humidified air, warm saltwater gargles, and over-the-counter cough drops for the sore throat.   Make sure you increase your water intake and stay well hydrated.    Take Motrin or Tylenol for fevers and body aches.    Finish entire course of abx.    Replace tooth brush in 24 hrs as well at end of treatment.   Double sanitize utensils    Continue supportive treatments with OTC meds for management of additional symptoms.

## 2024-12-26 NOTE — PROGRESS NOTES
"Subjective:      Patient ID: Sotero Pelaez is a 39 y.o. female.    Vitals:  height is 5' 3" (1.6 m) and weight is 109.8 kg (242 lb). Her oral temperature is 99.6 °F (37.6 °C). Her blood pressure is 140/87 (abnormal) and her pulse is 96. Her respiration is 16 and oxygen saturation is 98%.     Chief Complaint: Cough    Col/flu-like symptoms that began yesterday.  Denies ill contacts.  No additional family members and household.  Has been alternating between rigors, and sweating.  Has complaint of headache, nasal congestion, postnasal drip, exquisitely tender throat, and productive cough.  Has been using TheraFlu for symptom control with no improvement    Cough  This is a new problem. The current episode started yesterday. The cough is Productive of sputum. Associated symptoms include chills, a fever (Subjective), headaches, myalgias, nasal congestion, postnasal drip, a sore throat and sweats. Pertinent negatives include no shortness of breath or wheezing. Associated symptoms comments: Body aches. She has tried OTC cough suppressant for the symptoms.       Constitution: Positive for chills, sweating and fever (Subjective).   HENT:  Positive for congestion, postnasal drip and sore throat.    Neck: Positive for painful lymph nodes.   Cardiovascular: Negative.    Respiratory:  Positive for cough and sputum production. Negative for shortness of breath, wheezing and asthma.    Gastrointestinal: Negative.    Endocrine: negative.   Musculoskeletal:  Positive for muscle ache.   Skin:  Negative for color change.   Allergic/Immunologic: Positive for immunizations up-to-date. Negative for asthma.   Neurological:  Positive for headaches.   Hematologic/Lymphatic: Positive for swollen lymph nodes.   Psychiatric/Behavioral: Negative.        Objective:     Physical Exam   Constitutional: She is oriented to person, place, and time. She is cooperative.  Non-toxic appearance. She does not appear ill. No distress. obesity  HENT: "   Head: Normocephalic and atraumatic.   Ears:   Right Ear: External ear normal.   Left Ear: External ear normal.   Nose: Nose normal.   Mouth/Throat: Uvula is midline and mucous membranes are normal. Mucous membranes are moist. Posterior oropharyngeal erythema present. Tonsils are 3+ on the right. Tonsils are 3+ on the left. Tonsillar exudate. Oropharynx is clear.   Eyes: Conjunctivae and lids are normal. Pupils are equal, round, and reactive to light. Extraocular movement intact   Neck: Trachea normal and phonation normal. Neck supple.   Cardiovascular: Normal rate, regular rhythm, normal heart sounds and normal pulses.   Pulmonary/Chest: Effort normal and breath sounds normal.   Abdominal: Normal appearance.   Musculoskeletal: Normal range of motion.         General: Normal range of motion.   Lymphadenopathy:     She has cervical adenopathy.   Neurological: no focal deficit. She is alert, oriented to person, place, and time and at baseline. She has normal motor skills, normal sensation and intact cranial nerves (2-12). Gait and coordination normal. GCS eye subscore is 4. GCS verbal subscore is 5. GCS motor subscore is 6.   Skin: Skin is warm, dry and not diaphoretic. Capillary refill takes 2 to 3 seconds.   Psychiatric: She experiences Normal attention and Normal perception. Her speech is normal and behavior is normal. Mood, judgment and thought content normal.   Nursing note and vitals reviewed.      Assessment:     1. Exudative tonsillitis    2. Sore throat    3. Influenza A virus not detected    4. COVID-19 virus not detected        Plan:       Exudative tonsillitis  -     amoxicillin (AMOXIL) 500 MG Tab; Take 1 tablet (500 mg total) by mouth every 12 (twelve) hours. for 10 days  Dispense: 20 tablet; Refill: 0    Sore throat  -     SARS Coronavirus 2 Antigen, POCT Manual Read  -     POCT Influenza A/B Rapid Antigen  -     POCT rapid strep A    Influenza A virus not detected    COVID-19 virus not  detected      Rapid strep negative.  She has purulent exudative tonsils, and cervical adenopathy.  Will cover for likely bacterial etiology.  Viral testing also negative.

## 2025-02-14 DIAGNOSIS — Z30.46 ENCOUNTER FOR SURVEILLANCE OF IMPLANTABLE SUBDERMAL CONTRACEPTIVE: Primary | ICD-10-CM

## 2025-03-05 ENCOUNTER — TELEPHONE (OUTPATIENT)
Dept: OBSTETRICS AND GYNECOLOGY | Facility: CLINIC | Age: 40
End: 2025-03-05
Payer: COMMERCIAL

## 2025-03-05 NOTE — TELEPHONE ENCOUNTER
----- Message from Whitney sent at 3/5/2025 12:13 PM CST -----  Type:  Needs Medical AdviceWho Called: ptWould the patient rather a call back or a response via [facility]? Please callBe Call Back Number: [phone] Information: on 03/ 07 laura pt would like to also have a pap smear, if not possible, pl;ease call Please call back to advise. Thanks!

## 2025-03-07 ENCOUNTER — OFFICE VISIT (OUTPATIENT)
Dept: OBSTETRICS AND GYNECOLOGY | Facility: CLINIC | Age: 40
End: 2025-03-07
Payer: COMMERCIAL

## 2025-03-07 VITALS
BODY MASS INDEX: 43.28 KG/M2 | HEIGHT: 63 IN | WEIGHT: 244.25 LBS | SYSTOLIC BLOOD PRESSURE: 120 MMHG | DIASTOLIC BLOOD PRESSURE: 80 MMHG

## 2025-03-07 DIAGNOSIS — Z12.4 PAP SMEAR FOR CERVICAL CANCER SCREENING: ICD-10-CM

## 2025-03-07 DIAGNOSIS — Z30.017 NEXPLANON INSERTION: Primary | ICD-10-CM

## 2025-03-07 DIAGNOSIS — Z30.46 NEXPLANON REMOVAL: ICD-10-CM

## 2025-03-07 LAB
B-HCG UR QL: NEGATIVE
CTP QC/QA: YES

## 2025-03-07 PROCEDURE — 99999 PR PBB SHADOW E&M-EST. PATIENT-LVL III: CPT | Mod: PBBFAC,,, | Performed by: SPECIALIST

## 2025-03-07 NOTE — PROGRESS NOTES
38 yo BF  presents for annual gyn eval /PAP screening and  nexplanon removal and replacement UPT neg  Past Medical History:   Diagnosis Date    Anemia     Asthma     since younger    Herpes simplex without mention of complication     Obese        Past Surgical History:   Procedure Laterality Date    EYE SURGERY Bilateral 2016    Lasix       Family History   Problem Relation Name Age of Onset    Hypertension Mother      Diabetes Mother      Stroke Sister      Breast cancer Maternal Aunt Beatriz     Cancer Maternal Aunt Beatriz     Diabetes Maternal Aunt Beatriz     Colon cancer Neg Hx      Ovarian cancer Neg Hx         Social History     Socioeconomic History    Marital status: Single   Tobacco Use    Smoking status: Never    Smokeless tobacco: Never   Substance and Sexual Activity    Alcohol use: Yes     Comment: Seldomly    Drug use: Not Currently     Types: Marijuana     Comment: seldom    Sexual activity: Yes     Partners: Male     Birth control/protection: None, Implant     Comment: nexplanon       Current Medications[1]    Review of patient's allergies indicates:  No Known Allergies    Review of System:   General: no chills, fever, night sweats, weight gain or weight loss  Psychological: no depression or suicidal ideation  Breasts: no new or changing breast lumps, nipple discharge or masses.  Respiratory: no cough, shortness of breath, or wheezing  Cardiovascular: no chest pain or dyspnea on exertion  Gastrointestinal: no abdominal pain, change in bowel habits, or black or bloody stools  Genito-Urinary: no incontinence, urinary frequency/urgency or vulvar/vaginal symptoms, pelvic pain or abnormal vaginal bleeding.  Musculoskeletal: no gait disturbance or muscular weakness                                               General Appearance    A and O x 4, Cooperative, no distress   Breasts    Abdomen   Symmetrical, no masses, no discharge, skin changes , erythema or retraction. No adenopathy  Soft,  non-tender, bowel sounds active all four quadrants,  no masses, no organomegaly    Genitourinary:   External rectal exam shows no thrombosed external hemorrhoids.   Pelvic exam was performed with patient supine.  No labial fusion.  There is no rash, lesion or injury on the right labia.  There is no rash, lesion or injury on the left labia.  No bleeding and no signs of injury around the vaginal introitus, urethra is without lesions and well supported. The cervix is visualized with no discharge, lesions or friability.  No vaginal discharge found.  No significant Cystocele, Enterocele or rectocele, and uterus well supported.  Bimanual exam:  The urethra is normal to palpation and there are no palpable vaginal wall masses.  Uterus is not deviated, not enlarged, not fixed, normal shape and not tender.  Cervix exhibits no motion tenderness.   Right adnexum displays no mass and no tenderness.  Left adnexum displays no mass and no tenderness.   Extremities: Extremities normal, atraumatic, no cyanosis or edema                     NOTE  NURSING PERSONAL PRESENT FOR ENTIRE PHYSICAL EXAM     Procedure  Nexplanon Removal     After informed consent, betadine prep over implant site followed by 1% Lidocaine administered subQ over implant site. Small incision made over implant and mosquito hemostats used to grasp and remove implant w/o difficulty. Site bandaged and pt instructed to remove 30 min.    Procedure  Nexplanon Placement    After proper informed conscent, nondominant upper inner arm prepped with Betadine solution. 1% Lidocaine administered sub-Q along placement path.  Nexplanon delivery needle placed under skin, advanced to hub base and delivery lever depressed with implant delivered without difficulty.  Implant palpated under skin and compression bandage applied. Tolerated well.     Info card given and removal 3 years  BSE monthly  RTO 1 year/prn    I spent a total of 30 minutes on the day of the visit. This includes face  to face time and non-face to face time preparing to see the patient (eg, review of tests), obtaining and/or reviewing separately obtained history, documenting clinical information in the electronic or other health record, independently interpreting results and communicating results to the patient/family/caregiver, or care coordinator.                [1]   Current Outpatient Medications   Medication Sig Dispense Refill    pravastatin (PRAVACHOL) 40 MG tablet Take 1 tablet (40 mg total) by mouth every evening. (Patient not taking: Reported on 3/7/2025) 90 tablet 1    tirzepatide 7.5 mg/0.5 mL PnIj Inject 7.5 mg into the skin every 7 days. (Patient not taking: Reported on 3/7/2025) 6 mL 1     Current Facility-Administered Medications   Medication Dose Route Frequency Provider Last Rate Last Admin    etonogestreL subdermal device 68 mg  68 mg Implant  Michelle Meyer MD   68 mg at 12/08/21 0998

## 2025-03-18 ENCOUNTER — RESULTS FOLLOW-UP (OUTPATIENT)
Dept: OBSTETRICS AND GYNECOLOGY | Facility: CLINIC | Age: 40
End: 2025-03-18

## 2025-04-07 ENCOUNTER — OFFICE VISIT (OUTPATIENT)
Dept: OBSTETRICS AND GYNECOLOGY | Facility: CLINIC | Age: 40
End: 2025-04-07
Payer: COMMERCIAL

## 2025-04-07 VITALS — DIASTOLIC BLOOD PRESSURE: 78 MMHG | SYSTOLIC BLOOD PRESSURE: 124 MMHG | WEIGHT: 245.56 LBS | BODY MASS INDEX: 43.5 KG/M2

## 2025-04-07 DIAGNOSIS — N92.0 MENORRHAGIA WITH REGULAR CYCLE: Primary | ICD-10-CM

## 2025-04-07 PROCEDURE — 99213 OFFICE O/P EST LOW 20 MIN: CPT | Mod: S$GLB,,, | Performed by: SPECIALIST

## 2025-04-07 PROCEDURE — 1159F MED LIST DOCD IN RCRD: CPT | Mod: CPTII,S$GLB,, | Performed by: SPECIALIST

## 2025-04-07 PROCEDURE — 99999 PR PBB SHADOW E&M-EST. PATIENT-LVL III: CPT | Mod: PBBFAC,,, | Performed by: SPECIALIST

## 2025-04-07 PROCEDURE — 3078F DIAST BP <80 MM HG: CPT | Mod: CPTII,S$GLB,, | Performed by: SPECIALIST

## 2025-04-07 PROCEDURE — 3074F SYST BP LT 130 MM HG: CPT | Mod: CPTII,S$GLB,, | Performed by: SPECIALIST

## 2025-04-07 PROCEDURE — 3008F BODY MASS INDEX DOCD: CPT | Mod: CPTII,S$GLB,, | Performed by: SPECIALIST

## 2025-04-07 NOTE — PROGRESS NOTES
38 yo BF presents for recommended repeat PAP smear after initial PAP not processed. Pt continues to have menses q month Pt admits to significant weight gain over past 1-2 years and menses slightly heavier flow in that time  Past Medical History:   Diagnosis Date    Anemia     Asthma     since younger    Herpes simplex without mention of complication     Obese        Past Surgical History:   Procedure Laterality Date    EYE SURGERY Bilateral 2016    Lasix       Family History   Problem Relation Name Age of Onset    Hypertension Mother      Diabetes Mother      Stroke Sister      Breast cancer Maternal Aunt Beatriz     Cancer Maternal Aunt Beatriz     Diabetes Maternal Aunt Beatriz     Colon cancer Neg Hx      Ovarian cancer Neg Hx         Social History     Socioeconomic History    Marital status: Single   Tobacco Use    Smoking status: Never    Smokeless tobacco: Never   Substance and Sexual Activity    Alcohol use: Yes     Comment: Seldomly    Drug use: Not Currently     Types: Marijuana     Comment: seldom    Sexual activity: Yes     Partners: Male     Birth control/protection: None, Implant     Comment: nexplanon       Current Medications[1]    Review of patient's allergies indicates:  No Known Allergies    Review of System:   General: no chills, fever, night sweats, weight gain or weight loss  Psychological: no depression or suicidal ideation  Breasts: no new or changing breast lumps, nipple discharge or masses.  Respiratory: no cough, shortness of breath, or wheezing  Cardiovascular: no chest pain or dyspnea on exertion  Gastrointestinal: no abdominal pain, change in bowel habits, or black or bloody stools  Genito-Urinary: no incontinence, urinary frequency/urgency or vulvar/vaginal symptoms, pelvic pain. Relatively heavy menstual flow  Musculoskeletal: no gait disturbance or muscular weakness                                             General Appearance    A and O x 4, Cooperative, no distress    Breasts    Abdomen   deferred  Soft, non-tender, bowel sounds active all four quadrants,  no masses, no organomegaly    Genitourinary:   External rectal exam shows no thrombosed external hemorrhoids.   Pelvic exam was performed with patient supine.  No labial fusion.  There is no rash, lesion or injury on the right labia.  There is no rash, lesion or injury on the left labia.  No bleeding and no signs of injury around the vaginal introitus, urethra is without lesions and well supported. The cervix is visualized with no discharge, lesions or friability.  No vaginal discharge found.  No significant Cystocele, Enterocele or rectocele, and uterus well supported.  Bimanual exam:  The urethra is normal to palpation and there are no palpable vaginal wall masses.  Uterus is not deviated, not enlarged, not fixed, normal shape and not tender.  Cervix exhibits no motion tenderness.   Right adnexum displays no mass and no tenderness.  Left adnexum displays no mass and no tenderness.   Extremities: Extremities normal, atraumatic, no cyanosis or edema                     NOTE  NURSING PERSONAL PRESENT FOR ENTIRE PHYSICAL EXAM     PAP submitted    I discussed weight gain and excess endogenous E2 stim and thus effect on menses  Rec weight loss  Will follow PAP results    I spent a total of 30 minutes on the day of the visit. This includes face to face time and non-face to face time preparing to see the patient (eg, review of tests), obtaining and/or reviewing separately obtained history, documenting clinical information in the electronic or other health record, independently interpreting results and communicating results to the patient/family/caregiver, or care coordinator.            [1]   Current Outpatient Medications   Medication Sig Dispense Refill    pravastatin (PRAVACHOL) 40 MG tablet Take 1 tablet (40 mg total) by mouth every evening. (Patient not taking: Reported on 3/7/2025) 90 tablet 1    tirzepatide 7.5 mg/0.5 mL  PnIj Inject 7.5 mg into the skin every 7 days. (Patient not taking: Reported on 3/7/2025) 6 mL 1     Current Facility-Administered Medications   Medication Dose Route Frequency Provider Last Rate Last Admin    etonogestreL subdermal device 68 mg  68 mg Implant  Michelle Meyer MD   68 mg at 12/08/21 0951

## 2025-04-09 ENCOUNTER — LAB VISIT (OUTPATIENT)
Dept: LAB | Facility: HOSPITAL | Age: 40
End: 2025-04-09
Attending: NURSE PRACTITIONER
Payer: COMMERCIAL

## 2025-04-09 ENCOUNTER — RESULTS FOLLOW-UP (OUTPATIENT)
Dept: FAMILY MEDICINE | Facility: CLINIC | Age: 40
End: 2025-04-09

## 2025-04-09 DIAGNOSIS — E78.5 HYPERLIPIDEMIA, UNSPECIFIED HYPERLIPIDEMIA TYPE: ICD-10-CM

## 2025-04-09 DIAGNOSIS — E11.9 TYPE 2 DIABETES MELLITUS WITHOUT COMPLICATION, WITHOUT LONG-TERM CURRENT USE OF INSULIN: ICD-10-CM

## 2025-04-09 LAB
ALBUMIN SERPL-MCNC: 4.2 G/DL (ref 3.5–5.2)
ALP SERPL-CCNC: 66 UNIT/L (ref 55–135)
ALT SERPL-CCNC: 21 UNIT/L (ref 10–44)
ANION GAP (SMH): 7 MMOL/L (ref 8–16)
AST SERPL-CCNC: 14 UNIT/L (ref 10–40)
BILIRUB SERPL-MCNC: 0.5 MG/DL (ref 0.1–1)
BUN SERPL-MCNC: 18 MG/DL (ref 6–20)
CALCIUM SERPL-MCNC: 9.3 MG/DL (ref 8.7–10.5)
CHLORIDE SERPL-SCNC: 103 MMOL/L (ref 95–110)
CHOLEST SERPL-MCNC: 158 MG/DL (ref 120–199)
CHOLEST/HDLC SERPL: 3.5 {RATIO} (ref 2–5)
CO2 SERPL-SCNC: 26 MMOL/L (ref 23–29)
CREAT SERPL-MCNC: 0.7 MG/DL (ref 0.5–1.4)
EAG (SMH): 117 MG/DL (ref 68–131)
GFR SERPLBLD CREATININE-BSD FMLA CKD-EPI: >60 ML/MIN/1.73/M2
GLUCOSE SERPL-MCNC: 107 MG/DL (ref 70–110)
HBA1C MFR BLD: 5.7 % (ref 4.5–6.2)
HDLC SERPL-MCNC: 45 MG/DL (ref 40–75)
HDLC SERPL: 28.5 % (ref 20–50)
LDLC SERPL CALC-MCNC: 100.8 MG/DL (ref 63–159)
NONHDLC SERPL-MCNC: 113 MG/DL
POTASSIUM SERPL-SCNC: 4.2 MMOL/L (ref 3.5–5.1)
PROT SERPL-MCNC: 8.2 GM/DL (ref 6–8.4)
SODIUM SERPL-SCNC: 136 MMOL/L (ref 136–145)
TRIGL SERPL-MCNC: 61 MG/DL (ref 30–150)

## 2025-04-09 PROCEDURE — 36415 COLL VENOUS BLD VENIPUNCTURE: CPT

## 2025-04-09 PROCEDURE — 80061 LIPID PANEL: CPT

## 2025-04-09 PROCEDURE — 80053 COMPREHEN METABOLIC PANEL: CPT

## 2025-04-09 PROCEDURE — 83036 HEMOGLOBIN GLYCOSYLATED A1C: CPT

## 2025-04-16 ENCOUNTER — OFFICE VISIT (OUTPATIENT)
Dept: FAMILY MEDICINE | Facility: CLINIC | Age: 40
End: 2025-04-16
Payer: COMMERCIAL

## 2025-04-16 VITALS
BODY MASS INDEX: 43.21 KG/M2 | WEIGHT: 243.88 LBS | HEIGHT: 63 IN | TEMPERATURE: 97 F | SYSTOLIC BLOOD PRESSURE: 112 MMHG | OXYGEN SATURATION: 98 % | HEART RATE: 78 BPM | DIASTOLIC BLOOD PRESSURE: 84 MMHG

## 2025-04-16 DIAGNOSIS — E11.9 TYPE 2 DIABETES MELLITUS WITHOUT COMPLICATION, WITHOUT LONG-TERM CURRENT USE OF INSULIN: ICD-10-CM

## 2025-04-16 DIAGNOSIS — E78.5 HYPERLIPIDEMIA, UNSPECIFIED HYPERLIPIDEMIA TYPE: ICD-10-CM

## 2025-04-16 DIAGNOSIS — R53.83 FATIGUE, UNSPECIFIED TYPE: Primary | ICD-10-CM

## 2025-04-16 DIAGNOSIS — E66.01 MORBID OBESITY: ICD-10-CM

## 2025-04-16 PROCEDURE — 3074F SYST BP LT 130 MM HG: CPT | Mod: CPTII,S$GLB,, | Performed by: NURSE PRACTITIONER

## 2025-04-16 PROCEDURE — 3079F DIAST BP 80-89 MM HG: CPT | Mod: CPTII,S$GLB,, | Performed by: NURSE PRACTITIONER

## 2025-04-16 PROCEDURE — 1160F RVW MEDS BY RX/DR IN RCRD: CPT | Mod: CPTII,S$GLB,, | Performed by: NURSE PRACTITIONER

## 2025-04-16 PROCEDURE — 1159F MED LIST DOCD IN RCRD: CPT | Mod: CPTII,S$GLB,, | Performed by: NURSE PRACTITIONER

## 2025-04-16 PROCEDURE — 99999 PR PBB SHADOW E&M-EST. PATIENT-LVL III: CPT | Mod: PBBFAC,,, | Performed by: NURSE PRACTITIONER

## 2025-04-16 PROCEDURE — 99214 OFFICE O/P EST MOD 30 MIN: CPT | Mod: S$GLB,,, | Performed by: NURSE PRACTITIONER

## 2025-04-16 PROCEDURE — 3044F HG A1C LEVEL LT 7.0%: CPT | Mod: CPTII,S$GLB,, | Performed by: NURSE PRACTITIONER

## 2025-04-16 PROCEDURE — 3008F BODY MASS INDEX DOCD: CPT | Mod: CPTII,S$GLB,, | Performed by: NURSE PRACTITIONER

## 2025-04-16 RX ORDER — PRAVASTATIN SODIUM 40 MG/1
40 TABLET ORAL NIGHTLY
Qty: 90 TABLET | Refills: 1 | Status: SHIPPED | OUTPATIENT
Start: 2025-04-16

## 2025-04-16 NOTE — PROGRESS NOTES
SUBJECTIVE:      Patient ID: Sotero Pelaez is a 39 y.o. female.    Chief Complaint: Follow-up    HPI  Is here for follow up  Denies chest pain  Denies sob  Denies fever  Denies chills    Is on mounjaro 7.5 mg  Is on pravastatin- is not taking- recommended to start taking every evening again due to correlation with dm2 and heart disease; pt agreed  Is on estrogen  Vss; bp is well controlled    Dm2 eye exam is due- will consult opth- dr jess Webster booster and flu shot due- does not want at this time    A1c is 5.7 up from 5.4 but still below 6.0  Is doing well  Labs are stable  Will recheck in 6 months  Chol is nice      Past Surgical History:   Procedure Laterality Date    EYE SURGERY Bilateral 2016    Lasix     Family History   Problem Relation Name Age of Onset    Hypertension Mother      Diabetes Mother      Stroke Sister      Breast cancer Maternal Aunt Beatriz     Cancer Maternal Aunt Beatriz     Diabetes Maternal Aunt Beatriz     Colon cancer Neg Hx      Ovarian cancer Neg Hx        Social History     Socioeconomic History    Marital status: Single   Tobacco Use    Smoking status: Never    Smokeless tobacco: Never   Substance and Sexual Activity    Alcohol use: Yes     Comment: Seldomly    Drug use: Not Currently     Types: Marijuana     Comment: seldom    Sexual activity: Yes     Partners: Male     Birth control/protection: None, Implant     Comment: nexplanon     Current Medications[1]  Review of patient's allergies indicates:  No Known Allergies   Past Medical History:   Diagnosis Date    Anemia     Asthma     since younger    Herpes simplex without mention of complication     Obese      Past Surgical History:   Procedure Laterality Date    EYE SURGERY Bilateral 2016    Lasix       Review of Systems   All other systems reviewed and are negative.   OBJECTIVE:      Vitals:    04/16/25 0939   BP: 112/84   Pulse: 78   Temp: 97.4 °F (36.3 °C)   SpO2: 98%   Weight: 110.6 kg (243 lb  "14.4 oz)   Height: 5' 3" (1.6 m)     Physical Exam  Vitals and nursing note reviewed.   Constitutional:       Appearance: Normal appearance. She is obese.   HENT:      Head: Normocephalic and atraumatic.   Eyes:      Pupils: Pupils are equal, round, and reactive to light.   Neck:      Comments: No thrills no bruits  No abnormal neck masses felt  Cardiovascular:      Rate and Rhythm: Normal rate and regular rhythm.      Pulses: Normal pulses.      Heart sounds: Normal heart sounds.      Comments: S1 s2 nsr  No edema noted on bilateral lower ext  Pulmonary:      Effort: Pulmonary effort is normal.      Breath sounds: Normal breath sounds.   Musculoskeletal:      Cervical back: Normal range of motion.   Skin:     General: Skin is warm and dry.   Neurological:      General: No focal deficit present.      Mental Status: She is alert and oriented to person, place, and time. Mental status is at baseline.   Psychiatric:         Mood and Affect: Mood normal.         Behavior: Behavior normal.         Thought Content: Thought content normal.         Judgment: Judgment normal.      Comments: nonsuicidal      Assessment:       1. Fatigue, unspecified type    2. Type 2 diabetes mellitus without complication, without long-term current use of insulin    3. Morbid obesity    4. Hyperlipidemia, unspecified hyperlipidemia type        Plan:       Fatigue, unspecified type  -     TSH; Future; Expected date: 04/16/2025    Type 2 diabetes mellitus without complication, without long-term current use of insulin  -     tirzepatide 7.5 mg/0.5 mL PnIj; Inject 7.5 mg into the skin every 7 days.  Dispense: 6 mL; Refill: 1  -     Ambulatory referral/consult to Ophthalmology; Future; Expected date: 04/16/2025  -     Comprehensive Metabolic Panel; Future; Expected date: 04/16/2025  -     CBC Auto Differential; Future; Expected date: 04/16/2025  -     Hemoglobin A1C; Future; Expected date: 04/16/2025    Morbid obesity    Hyperlipidemia, unspecified " hyperlipidemia type  -     pravastatin (PRAVACHOL) 40 MG tablet; Take 1 tablet (40 mg total) by mouth every evening.  Dispense: 90 tablet; Refill: 1  -     Lipid Panel; Future; Expected date: 04/16/2025      Take medications as ordered  Get fasting blood work in 6 months at [facility]  Follow up in 6 months or sooner if needed    Follow up if symptoms worsen or fail to improve.      4/16/2025 WILLIAM Bajwa, FNP             [1]  Current Outpatient Medications   Medication Sig Dispense Refill    pravastatin (PRAVACHOL) 40 MG tablet Take 1 tablet (40 mg total) by mouth every evening. 90 tablet 1    tirzepatide 7.5 mg/0.5 mL PnIj Inject 7.5 mg into the skin every 7 days. 6 mL 1     Current Facility-Administered Medications   Medication Dose Route Frequency Provider Last Rate Last Admin    etonogestreL subdermal device 68 mg  68 mg Implant  Michelle Meyer MD   68 mg at 12/08/21 0920

## 2025-04-17 ENCOUNTER — TELEPHONE (OUTPATIENT)
Dept: FAMILY MEDICINE | Facility: CLINIC | Age: 40
End: 2025-04-17
Payer: COMMERCIAL

## 2025-04-21 ENCOUNTER — RESULTS FOLLOW-UP (OUTPATIENT)
Dept: OBSTETRICS AND GYNECOLOGY | Facility: CLINIC | Age: 40
End: 2025-04-21

## 2025-05-02 ENCOUNTER — HOSPITAL ENCOUNTER (EMERGENCY)
Facility: HOSPITAL | Age: 40
Discharge: HOME | End: 2025-05-02
Attending: EMERGENCY MEDICINE
Payer: COMMERCIAL

## 2025-05-02 VITALS
OXYGEN SATURATION: 97 % | HEIGHT: 63 IN | RESPIRATION RATE: 16 BRPM | BODY MASS INDEX: 43.05 KG/M2 | WEIGHT: 243 LBS | DIASTOLIC BLOOD PRESSURE: 69 MMHG | SYSTOLIC BLOOD PRESSURE: 124 MMHG | TEMPERATURE: 98 F | HEART RATE: 72 BPM

## 2025-05-02 DIAGNOSIS — R07.9 CHEST PAIN: ICD-10-CM

## 2025-05-02 DIAGNOSIS — R07.89 ATYPICAL CHEST PAIN: Primary | ICD-10-CM

## 2025-05-02 LAB
ABSOLUTE EOSINOPHIL (SMH): 0.09 K/UL
ABSOLUTE MONOCYTE (SMH): 0.38 K/UL (ref 0.3–1)
ABSOLUTE NEUTROPHIL COUNT (SMH): 3.7 K/UL (ref 1.8–7.7)
ALBUMIN SERPL-MCNC: 3.7 G/DL (ref 3.5–5.2)
ALP SERPL-CCNC: 64 UNIT/L (ref 40–150)
ALT SERPL-CCNC: 17 UNIT/L (ref 10–44)
ANION GAP (SMH): 9 MMOL/L (ref 8–16)
AST SERPL-CCNC: 19 UNIT/L (ref 11–45)
B-HCG UR QL: NEGATIVE
BASOPHILS # BLD AUTO: 0.02 K/UL
BASOPHILS NFR BLD AUTO: 0.3 %
BILIRUB SERPL-MCNC: 0.4 MG/DL (ref 0.1–1)
BUN SERPL-MCNC: 12 MG/DL (ref 6–20)
CALCIUM SERPL-MCNC: 9.1 MG/DL (ref 8.7–10.5)
CHLORIDE SERPL-SCNC: 105 MMOL/L (ref 95–110)
CO2 SERPL-SCNC: 25 MMOL/L (ref 23–29)
CREAT SERPL-MCNC: 0.7 MG/DL (ref 0.5–1.4)
CTP QC/QA: YES
D DIMER PPP IA.FEU-MCNC: 0.29 MG/L FEU
ERYTHROCYTE [DISTWIDTH] IN BLOOD BY AUTOMATED COUNT: 12.9 % (ref 11.5–14.5)
GFR SERPLBLD CREATININE-BSD FMLA CKD-EPI: >60 ML/MIN/1.73/M2
GLUCOSE SERPL-MCNC: 121 MG/DL (ref 70–110)
HCT VFR BLD AUTO: 33.7 % (ref 37–48.5)
HCV AB SERPL QL IA: NORMAL
HGB BLD-MCNC: 10.9 GM/DL (ref 12–16)
HIV 1+2 AB+HIV1 P24 AG SERPL QL IA: NORMAL
IMM GRANULOCYTES # BLD AUTO: 0.02 K/UL (ref 0–0.04)
IMM GRANULOCYTES NFR BLD AUTO: 0.3 % (ref 0–0.5)
LYMPHOCYTES # BLD AUTO: 2.44 K/UL (ref 1–4.8)
MAGNESIUM SERPL-MCNC: 1.8 MG/DL (ref 1.6–2.6)
MCH RBC QN AUTO: 27.3 PG (ref 27–31)
MCHC RBC AUTO-ENTMCNC: 32.3 G/DL (ref 32–36)
MCV RBC AUTO: 85 FL (ref 82–98)
NUCLEATED RBC (/100WBC) (SMH): 0 /100 WBC
OHS QRS DURATION: 78 MS
OHS QTC CALCULATION: 381 MS
PLATELET # BLD AUTO: 265 K/UL (ref 150–450)
PMV BLD AUTO: 8.9 FL (ref 9.2–12.9)
POTASSIUM SERPL-SCNC: 3.9 MMOL/L (ref 3.5–5.1)
PROT SERPL-MCNC: 7.6 GM/DL (ref 6–8.4)
RBC # BLD AUTO: 3.99 M/UL (ref 4–5.4)
RELATIVE EOSINOPHIL (SMH): 1.4 % (ref 0–8)
RELATIVE LYMPHOCYTE (SMH): 36.9 % (ref 18–48)
RELATIVE MONOCYTE (SMH): 5.7 % (ref 4–15)
RELATIVE NEUTROPHIL (SMH): 55.4 % (ref 38–73)
SODIUM SERPL-SCNC: 139 MMOL/L (ref 136–145)
TROPONIN I SERPL HS-MCNC: <3 NG/L
TROPONIN I SERPL HS-MCNC: <3 NG/L
WBC # BLD AUTO: 6.61 K/UL (ref 3.9–12.7)

## 2025-05-02 PROCEDURE — 36415 COLL VENOUS BLD VENIPUNCTURE: CPT | Performed by: EMERGENCY MEDICINE

## 2025-05-02 PROCEDURE — 63600175 PHARM REV CODE 636 W HCPCS: Mod: TB,JZ | Performed by: EMERGENCY MEDICINE

## 2025-05-02 PROCEDURE — 84484 ASSAY OF TROPONIN QUANT: CPT | Performed by: EMERGENCY MEDICINE

## 2025-05-02 PROCEDURE — 84484 ASSAY OF TROPONIN QUANT: CPT | Performed by: NURSE PRACTITIONER

## 2025-05-02 PROCEDURE — 99285 EMERGENCY DEPT VISIT HI MDM: CPT | Mod: 25

## 2025-05-02 PROCEDURE — 96374 THER/PROPH/DIAG INJ IV PUSH: CPT

## 2025-05-02 PROCEDURE — 94760 N-INVAS EAR/PLS OXIMETRY 1: CPT

## 2025-05-02 PROCEDURE — 83735 ASSAY OF MAGNESIUM: CPT | Performed by: EMERGENCY MEDICINE

## 2025-05-02 PROCEDURE — 85379 FIBRIN DEGRADATION QUANT: CPT | Performed by: EMERGENCY MEDICINE

## 2025-05-02 PROCEDURE — 85025 COMPLETE CBC W/AUTO DIFF WBC: CPT | Performed by: NURSE PRACTITIONER

## 2025-05-02 PROCEDURE — 86803 HEPATITIS C AB TEST: CPT | Performed by: EMERGENCY MEDICINE

## 2025-05-02 PROCEDURE — 36415 COLL VENOUS BLD VENIPUNCTURE: CPT | Performed by: NURSE PRACTITIONER

## 2025-05-02 PROCEDURE — 81025 URINE PREGNANCY TEST: CPT | Performed by: NURSE PRACTITIONER

## 2025-05-02 PROCEDURE — 93005 ELECTROCARDIOGRAM TRACING: CPT

## 2025-05-02 PROCEDURE — 93010 ELECTROCARDIOGRAM REPORT: CPT | Mod: ,,, | Performed by: GENERAL PRACTICE

## 2025-05-02 PROCEDURE — 84460 ALANINE AMINO (ALT) (SGPT): CPT | Performed by: NURSE PRACTITIONER

## 2025-05-02 PROCEDURE — 87389 HIV-1 AG W/HIV-1&-2 AB AG IA: CPT | Performed by: EMERGENCY MEDICINE

## 2025-05-02 RX ORDER — KETOROLAC TROMETHAMINE 30 MG/ML
15 INJECTION, SOLUTION INTRAMUSCULAR; INTRAVENOUS
Status: COMPLETED | OUTPATIENT
Start: 2025-05-02 | End: 2025-05-02

## 2025-05-02 RX ORDER — DICLOFENAC SODIUM 50 MG/1
50 TABLET, DELAYED RELEASE ORAL 3 TIMES DAILY PRN
Qty: 15 TABLET | Refills: 0 | Status: SHIPPED | OUTPATIENT
Start: 2025-05-02

## 2025-05-02 RX ADMIN — KETOROLAC TROMETHAMINE 15 MG: 30 INJECTION, SOLUTION INTRAMUSCULAR at 03:05

## 2025-05-02 NOTE — ED PROVIDER NOTES
Encounter Date: 5/2/2025       History     Chief Complaint   Patient presents with    Chest Pain     Worse with deep breath , started this am      39-year-old female with a past medical history of hyperlipidemia, anemia, asthma, and diabetes mellitus presents for evaluation of chest pain.  She reports that she started with a left-sided chest pain this morning.  She reports her pain is worse with movement and deep breathing.  She reports that it is just to the left of her sternal region.  There are no alleviating factors.  She denies any associated shortness of breath, cough, congestion, nausea/vomiting, leg pain, or abdominal pain.      Review of patient's allergies indicates:  No Known Allergies  Past Medical History:   Diagnosis Date    Anemia     Asthma     since younger    Herpes simplex without mention of complication     Obese      Past Surgical History:   Procedure Laterality Date    EYE SURGERY Bilateral 2016    Lasix     Family History   Problem Relation Name Age of Onset    Hypertension Mother      Diabetes Mother      Stroke Sister      Breast cancer Maternal Aunt Beatriz     Cancer Maternal Aunt Beatriz     Diabetes Maternal Aunt Beatriz     Colon cancer Neg Hx      Ovarian cancer Neg Hx       Social History[1]  Review of Systems   Constitutional:  Negative for chills and fever.   HENT:  Negative for congestion.    Respiratory:  Negative for cough and shortness of breath.    Cardiovascular:  Positive for chest pain.   Gastrointestinal:  Negative for abdominal pain, nausea and vomiting.   Genitourinary:  Negative for dysuria.   Musculoskeletal:  Negative for gait problem.   Skin:  Negative for color change.   Neurological:  Negative for dizziness and numbness.   Psychiatric/Behavioral:  Negative for agitation.        Physical Exam     Initial Vitals [05/02/25 1033]   BP Pulse Resp Temp SpO2   (!) 119/56 82 18 98.1 °F (36.7 °C) 99 %      MAP       --         Physical Exam    Nursing note and vitals  reviewed.  Constitutional: She appears well-developed and well-nourished.   HENT:   Head: Atraumatic.   Eyes: EOM are normal. Pupils are equal, round, and reactive to light.   Neck:   Normal range of motion.  Cardiovascular:  Normal rate and regular rhythm.           Pulmonary/Chest: Breath sounds normal. She exhibits tenderness.   Tenderness to palpation along the left parasternal region.   Abdominal: Abdomen is soft. Bowel sounds are normal. She exhibits no distension. There is no abdominal tenderness. There is no rebound and no guarding.   Musculoskeletal:         General: Normal range of motion.      Right shoulder: Normal.      Left shoulder: Normal.      Cervical back: Normal range of motion.     Neurological: She is alert and oriented to person, place, and time.   Skin: Skin is warm and dry.   Psychiatric: She has a normal mood and affect.         ED Course   Procedures  Labs Reviewed   COMPREHENSIVE METABOLIC PANEL - Abnormal       Result Value    Sodium 139      Potassium 3.9      Chloride 105      CO2 25      Glucose 121 (*)     BUN 12      Creatinine 0.7      Calcium 9.1      Protein Total 7.6      Albumin 3.7      Bilirubin Total 0.4      ALP 64      AST 19      ALT 17      Anion Gap 9      eGFR >60     CBC WITH DIFFERENTIAL - Abnormal    WBC 6.61      RBC 3.99 (*)     Hgb 10.9 (*)     Hct 33.7 (*)     MCV 85      MCH 27.3      MCHC 32.3      RDW 12.9      Platelet Count 265      MPV 8.9 (*)     Nucleated RBC 0      Neut % 55.4      Lymph % 36.9      Mono % 5.7      Eos % 1.4      Basophil % 0.3      Imm Grans % 0.3      Neut # 3.7      Lymph # 2.44      Mono # 0.38      Eos # 0.09      Baso # 0.02      Imm Grans # 0.02     HEPATITIS C ANTIBODY - Normal    Hep C Ab Interp Non-Reactive     HIV 1 / 2 ANTIBODY - Normal    HIV 1/2 Ag/Ab Non-Reactive     TROPONIN I HIGH SENSITIVITY - Normal    Troponin High Sensitive <3     D DIMER, QUANTITATIVE - Normal    D-Dimer 0.29     MAGNESIUM - Normal    Magnesium 1.8      TROPONIN I HIGH SENSITIVITY - Normal    Troponin High Sensitive <3     CBC W/ AUTO DIFFERENTIAL    Narrative:     The following orders were created for panel order CBC auto differential.  Procedure                               Abnormality         Status                     ---------                               -----------         ------                     CBC with Differential[[id number]]       Abnormal            Final result                 Please view results for these tests on the individual orders.   POCT URINE PREGNANCY    POC Preg Test, Ur Negative       Acceptable Yes       EKG Readings: (Independently Interpreted)   Initial Reading: No STEMI. Rhythm: Normal Sinus Rhythm. Heart Rate: 66. Ectopy: No Ectopy. Conduction: Normal. ST Segments: Normal ST Segments. T Waves: Normal. Clinical Impression: Normal Sinus Rhythm       Imaging Results              X-Ray Chest PA And Lateral (Final result)  Result time 05/02/25 11:34:51      Final result by Bladimir Law MD (05/02/25 11:34:51)                   Impression:      Negative chest.      Electronically signed by: Bladimir Law MD  Date:    05/02/2025  Time:    11:34               Narrative:    EXAMINATION:  XR CHEST PA AND LATERAL    CLINICAL HISTORY:  Chest Pain;    TECHNIQUE:  PA and lateral views of the chest were performed.    COMPARISON:  None    FINDINGS:  The cardiomediastinal silhouette is within normal limits.  The lungs are well expanded without consolidation or pleural effusion.                                       Medications   ketorolac injection 15 mg (15 mg Intravenous Given 5/2/25 6738)     Medical Decision Making  39-year-old female presented for chest pain.    Initial differential diagnosis included but not limited to pulmonary embolism, chest wall pain, and atypical MI.    Amount and/or Complexity of Data Reviewed  Labs: ordered.  Radiology: ordered.  ECG/medicine tests: ordered.    Risk  Prescription drug  management.  Risk Details: The patient was emergently evaluated in the emergency department, her evaluation was significant for a young female with reproducible pain on palpation of her chest.  The patient's EKG showed no acute abnormalities per my independent interpretation.  The patient's chest x-ray showed no acute abnormalities per Radiology.  The patient's labs were significant for multiple serial negative troponins and a normal D-dimer.  The etiology of her symptoms is likely chest wall pain.  She was treated here with a dose of parental Toradol.  She is stable for discharge to home and does not require further care or workup at this time.  She will be discharged home with p.o. diclofenac.  She is referred to primary care for follow-up.                                      Clinical Impression:  Final diagnoses:  [R07.9] Chest pain  [R07.89] Atypical chest pain (Primary)          ED Disposition Condition    Discharge Stable          ED Prescriptions       Medication Sig Dispense Start Date End Date Auth. Provider    diclofenac (VOLTAREN) 50 MG EC tablet Take 1 tablet (50 mg total) by mouth 3 (three) times daily as needed (pain). 15 tablet 5/2/2025 -- Emir Mcintosh MD          Follow-up Information       Follow up With Specialties Details Why Contact Info    Valdemar Richmond III, MD Family Medicine Schedule an appointment as soon as possible for a visit   [address] [place] [address]  [address] [place] [address]  [phone]                   [1]   Social History  Tobacco Use    Smoking status: Never    Smokeless tobacco: Never   Substance Use Topics    Alcohol use: Yes     Comment: Seldomly    Drug use: Not Currently     Types: Marijuana     Comment: seldom        Emir Mcintosh MD  05/02/25 [id number]

## 2025-05-02 NOTE — FIRST PROVIDER EVALUATION
Emergency Department TeleTriage Encounter Note      CHIEF COMPLAINT    Chief Complaint   Patient presents with    Chest Pain     Worse with deep breath , started this am        VITAL SIGNS   Initial Vitals [05/02/25 1033]   BP Pulse Resp Temp SpO2   (!) 119/56 82 18 98.1 °F (36.7 °C) 99 %      MAP       --            ALLERGIES    Review of patient's allergies indicates:  No Known Allergies    PROVIDER TRIAGE NOTE  Reports chest is tight and pain worsens with deep breathing. Symptoms began this morning. No similar symptoms in the past. History of DM, hyperlipidemia.     Limited physical exam via telehealth: The patient is awake, alert, answering questions appropriately and is not in respiratory distress.  As the Telet[facility] provider, I performed an initial assessment and ordered appropriate labs and imaging studies, if any, to facilitate the patient's care once placed in the ED. Once a room is available, care and a full evaluation will be completed by an alternate ED provider.  Any additional orders and the final disposition will be determined by that provider.  All imaging and labs will not be followed-up by the Teletriage Team, including myself.        ORDERS  Labs Reviewed   HEPATITIS C ANTIBODY   HIV 1 / 2 ANTIBODY       ED Orders (720h ago, onward)      Start Ordered     Status Ordering Provider    05/02/25 1244 05/02/25 1044  Troponin I High Sensitivity #2  Once Timed         Ordered RODNEY CABALLERO    05/02/25 1044 05/02/25 1044  Vital signs  Every 15 min         Ordered RODNEY CABALLERO    05/02/25 1044 05/02/25 1044  Cardiac Monitoring - Adult  Continuous        Comments: Notify Physician If:    Ordered RODNEY CABALLERO    05/02/25 1044 05/02/25 1044  Pulse Oximetry Continuous  Continuous         Ordered RODNEY CABALLERO    05/02/25 1044 05/02/25 1044  Diet NPO  Diet effective now         Ordered RODNEY CABALLERO    05/02/25 1044 05/02/25 1044  Saline lock IV  Once         Ordered RODNEY CABALLERO     05/02/25 1044 05/02/25 1044  CBC auto differential  STAT         Ordered DARDARRODNEY.    05/02/25 1044 05/02/25 1044  Comprehensive metabolic panel  STAT         Ordered DARDAR, RODNEY A.    05/02/25 1044 05/02/25 1044  Troponin I High Sensitivity #1  STAT         Ordered DARDAR RODNEY MOORE.    05/02/25 1044 05/02/25 1044  X-Ray Chest PA And Lateral  1 time imaging         Ordered DARDAR RODNEY A.    05/02/25 1044 05/02/25 1044  POCT urine pregnancy  Once         Ordered DARDAR RODNEY A.    05/02/25 1044 05/02/25 1044  CBC with Differential  PROCEDURE ONCE         Ordered SCOTTIEDARYAN RODNEY A.    05/02/25 1038 05/02/25 1037  Hepatitis C Antibody  STAT         Ordered TORI SALAZAR    05/02/25 1038 05/02/25 1037  HIV 1/2 Ag/Ab (4th Gen)  STAT         Ordered TORI SALAZAR    05/02/25 1034 05/02/25 1033  EKG 12-lead  Once         Completed by BRYON KILGORE on 5/2/2025 at 10:39 AM SUKHDEV EASLEY              Virtual Visit Note: The provider triage portion of this emergency department evaluation and documentation was performed via [employer], a HIPAA-compliant telemedicine application, in concert with a tele-presenter in the room. A face to face patient evaluation with one of my colleagues will occur once the patient is placed in an emergency department room.      DISCLAIMER: This note was prepared with [employer] voice recognition transcription software. Garbled syntax, mangled pronouns, and other bizarre constructions may be attributed to that software system.

## 2025-05-02 NOTE — ED NOTES
Collection cup given to patient for urine collection. Patient understands instructions and that we need urine specimen.

## 2025-05-02 NOTE — ED NOTES
Assumed care    Patient presents to ED with complaints of chest pain that started this morning to middle of chest that is constant. Patient without signs of distress at this time. Denies n/v/d at this time. AAOx4. Patient states she does not take any medications at home but was prescribed a cholesterol medication that she just picked up. Patient without change in vision or change in urinary habits.

## 2025-05-23 ENCOUNTER — PATIENT MESSAGE (OUTPATIENT)
Dept: ADMINISTRATIVE | Facility: HOSPITAL | Age: 40
End: 2025-05-23
Payer: COMMERCIAL